# Patient Record
Sex: MALE | Race: WHITE | NOT HISPANIC OR LATINO | Employment: OTHER | URBAN - METROPOLITAN AREA
[De-identification: names, ages, dates, MRNs, and addresses within clinical notes are randomized per-mention and may not be internally consistent; named-entity substitution may affect disease eponyms.]

---

## 2019-03-09 ENCOUNTER — HOSPITAL ENCOUNTER (EMERGENCY)
Facility: HOSPITAL | Age: 59
Discharge: HOME | End: 2019-03-09
Attending: EMERGENCY MEDICINE
Payer: COMMERCIAL

## 2019-03-09 ENCOUNTER — APPOINTMENT (EMERGENCY)
Dept: RADIOLOGY | Facility: HOSPITAL | Age: 59
End: 2019-03-09
Payer: COMMERCIAL

## 2019-03-09 VITALS
DIASTOLIC BLOOD PRESSURE: 67 MMHG | WEIGHT: 314.38 LBS | OXYGEN SATURATION: 97 % | SYSTOLIC BLOOD PRESSURE: 126 MMHG | RESPIRATION RATE: 20 BRPM | HEART RATE: 76 BPM | TEMPERATURE: 97.9 F

## 2019-03-09 DIAGNOSIS — R11.0 NAUSEA: Primary | ICD-10-CM

## 2019-03-09 LAB
ALBUMIN SERPL BCP-MCNC: 3.9 G/DL (ref 3.5–5)
ALP SERPL-CCNC: 105 U/L (ref 46–116)
ALT SERPL W P-5'-P-CCNC: 34 U/L (ref 12–78)
ANION GAP SERPL CALCULATED.3IONS-SCNC: 9 MMOL/L (ref 4–13)
AST SERPL W P-5'-P-CCNC: 18 U/L (ref 5–45)
ATRIAL RATE: 81 BPM
BASOPHILS # BLD AUTO: 0.06 THOUSANDS/ΜL (ref 0–0.1)
BASOPHILS NFR BLD AUTO: 1 % (ref 0–1)
BILIRUB SERPL-MCNC: 0.5 MG/DL (ref 0.2–1)
BUN SERPL-MCNC: 9 MG/DL (ref 5–25)
CALCIUM SERPL-MCNC: 9 MG/DL (ref 8.3–10.1)
CHLORIDE SERPL-SCNC: 102 MMOL/L (ref 100–108)
CO2 SERPL-SCNC: 24 MMOL/L (ref 21–32)
CREAT SERPL-MCNC: 1.29 MG/DL (ref 0.6–1.3)
EOSINOPHIL # BLD AUTO: 0.02 THOUSAND/ΜL (ref 0–0.61)
EOSINOPHIL NFR BLD AUTO: 0 % (ref 0–6)
ERYTHROCYTE [DISTWIDTH] IN BLOOD BY AUTOMATED COUNT: 13.3 % (ref 11.6–15.1)
GFR SERPL CREATININE-BSD FRML MDRD: 60 ML/MIN/1.73SQ M
GLUCOSE SERPL-MCNC: 102 MG/DL (ref 65–140)
GLUCOSE SERPL-MCNC: 114 MG/DL (ref 65–140)
HCT VFR BLD AUTO: 45.5 % (ref 36.5–49.3)
HGB BLD-MCNC: 15.8 G/DL (ref 12–17)
IMM GRANULOCYTES # BLD AUTO: 0.02 THOUSAND/UL (ref 0–0.2)
IMM GRANULOCYTES NFR BLD AUTO: 0 % (ref 0–2)
LIPASE SERPL-CCNC: 123 U/L (ref 73–393)
LYMPHOCYTES # BLD AUTO: 0.53 THOUSANDS/ΜL (ref 0.6–4.47)
LYMPHOCYTES NFR BLD AUTO: 9 % (ref 14–44)
MAGNESIUM SERPL-MCNC: 2.1 MG/DL (ref 1.6–2.6)
MCH RBC QN AUTO: 30.2 PG (ref 26.8–34.3)
MCHC RBC AUTO-ENTMCNC: 34.7 G/DL (ref 31.4–37.4)
MCV RBC AUTO: 87 FL (ref 82–98)
MONOCYTES # BLD AUTO: 0.4 THOUSAND/ΜL (ref 0.17–1.22)
MONOCYTES NFR BLD AUTO: 7 % (ref 4–12)
NEUTROPHILS # BLD AUTO: 4.79 THOUSANDS/ΜL (ref 1.85–7.62)
NEUTS SEG NFR BLD AUTO: 83 % (ref 43–75)
NRBC BLD AUTO-RTO: 0 /100 WBCS
NT-PROBNP SERPL-MCNC: 173 PG/ML
P AXIS: 24 DEGREES
PLATELET # BLD AUTO: 182 THOUSANDS/UL (ref 149–390)
PMV BLD AUTO: 9.2 FL (ref 8.9–12.7)
POTASSIUM SERPL-SCNC: 4.2 MMOL/L (ref 3.5–5.3)
PR INTERVAL: 182 MS
PROT SERPL-MCNC: 7.6 G/DL (ref 6.4–8.2)
QRS AXIS: -29 DEGREES
QRSD INTERVAL: 88 MS
QT INTERVAL: 358 MS
QTC INTERVAL: 415 MS
RBC # BLD AUTO: 5.24 MILLION/UL (ref 3.88–5.62)
SODIUM SERPL-SCNC: 135 MMOL/L (ref 136–145)
T WAVE AXIS: 25 DEGREES
TROPONIN I SERPL-MCNC: <0.02 NG/ML
VENTRICULAR RATE: 81 BPM
WBC # BLD AUTO: 5.82 THOUSAND/UL (ref 4.31–10.16)

## 2019-03-09 PROCEDURE — 93005 ELECTROCARDIOGRAM TRACING: CPT

## 2019-03-09 PROCEDURE — 96361 HYDRATE IV INFUSION ADD-ON: CPT

## 2019-03-09 PROCEDURE — 96374 THER/PROPH/DIAG INJ IV PUSH: CPT

## 2019-03-09 PROCEDURE — 36415 COLL VENOUS BLD VENIPUNCTURE: CPT | Performed by: EMERGENCY MEDICINE

## 2019-03-09 PROCEDURE — 85025 COMPLETE CBC W/AUTO DIFF WBC: CPT | Performed by: EMERGENCY MEDICINE

## 2019-03-09 PROCEDURE — 83880 ASSAY OF NATRIURETIC PEPTIDE: CPT | Performed by: EMERGENCY MEDICINE

## 2019-03-09 PROCEDURE — 99284 EMERGENCY DEPT VISIT MOD MDM: CPT

## 2019-03-09 PROCEDURE — 96375 TX/PRO/DX INJ NEW DRUG ADDON: CPT

## 2019-03-09 PROCEDURE — 83735 ASSAY OF MAGNESIUM: CPT | Performed by: EMERGENCY MEDICINE

## 2019-03-09 PROCEDURE — 71045 X-RAY EXAM CHEST 1 VIEW: CPT

## 2019-03-09 PROCEDURE — 93010 ELECTROCARDIOGRAM REPORT: CPT | Performed by: INTERNAL MEDICINE

## 2019-03-09 PROCEDURE — 83690 ASSAY OF LIPASE: CPT | Performed by: EMERGENCY MEDICINE

## 2019-03-09 PROCEDURE — 82948 REAGENT STRIP/BLOOD GLUCOSE: CPT

## 2019-03-09 PROCEDURE — 80053 COMPREHEN METABOLIC PANEL: CPT | Performed by: EMERGENCY MEDICINE

## 2019-03-09 PROCEDURE — C9113 INJ PANTOPRAZOLE SODIUM, VIA: HCPCS | Performed by: EMERGENCY MEDICINE

## 2019-03-09 PROCEDURE — 84484 ASSAY OF TROPONIN QUANT: CPT | Performed by: EMERGENCY MEDICINE

## 2019-03-09 RX ORDER — ONDANSETRON 2 MG/ML
4 INJECTION INTRAMUSCULAR; INTRAVENOUS ONCE
Status: COMPLETED | OUTPATIENT
Start: 2019-03-09 | End: 2019-03-09

## 2019-03-09 RX ORDER — PANTOPRAZOLE SODIUM 40 MG/1
40 INJECTION, POWDER, FOR SOLUTION INTRAVENOUS ONCE
Status: COMPLETED | OUTPATIENT
Start: 2019-03-09 | End: 2019-03-09

## 2019-03-09 RX ORDER — ONDANSETRON 4 MG/1
4 TABLET, FILM COATED ORAL EVERY 8 HOURS PRN
Qty: 20 TABLET | Refills: 0 | Status: SHIPPED | OUTPATIENT
Start: 2019-03-09

## 2019-03-09 RX ADMIN — PANTOPRAZOLE SODIUM 40 MG: 40 INJECTION, POWDER, FOR SOLUTION INTRAVENOUS at 12:06

## 2019-03-09 RX ADMIN — ONDANSETRON 4 MG: 2 INJECTION INTRAMUSCULAR; INTRAVENOUS at 10:34

## 2019-03-09 RX ADMIN — SODIUM CHLORIDE 1000 ML: 0.9 INJECTION, SOLUTION INTRAVENOUS at 10:36

## 2019-03-09 NOTE — ED NOTES
Medical records request from [facility] [place] sent now       Harriet Chandler, GABBIE  03/09/19 4747

## 2019-03-09 NOTE — ED PROVIDER NOTES
History  Chief Complaint   Patient presents with    Dizziness     complains of dizziness , and nausea this am, feels like he's going to pass out  seen at [facility] 10 days ago, Has had multilple tests done there including a Cat Scan, No normal BM since October     60year-old gentleman comes in for nausea and feeling like he is going to pass out  Patient states that he has had trouble with his stomach since October  Patient reports alternating constipation and diarrhea  Patient states he has been through a battery of tests including going to see Cardiology and having a stress echo that was negative he has also been did GI and they tried several different medications  Patient complains of acid reflux like symptoms and also constipation today  He is currently taking Zantac and MiraLax for his symptoms  Patient states that he is set up to have an endoscopy next week with GI  Patient had CT scan 10 days ago at an outside medical center which was normal      History provided by:  Patient   used: No    Dizziness   Quality:  Lightheadedness  Severity:  Moderate  Onset quality:  Sudden  Duration:  1 hour  Timing:  Constant  Progression:  Unchanged  Chronicity:  Recurrent  Context: inactivity    Ineffective treatments:  Change in position, lying down and medication  Associated symptoms: weakness    Associated symptoms: no blood in stool, no chest pain, no headaches and no shortness of breath    Risk factors: no anemia and no Meniere's disease        None       Past Medical History:   Diagnosis Date    GERD (gastroesophageal reflux disease)     Hypertension        History reviewed  No pertinent surgical history  History reviewed  No pertinent family history  I have reviewed and agree with the history as documented  Social History     Tobacco Use    Smoking status: Never Smoker    Smokeless tobacco: Never Used   Substance Use Topics    Alcohol use:  Yes     Alcohol/week: 3 0 oz Types: 5 Shots of liquor per week    Drug use: Yes     Frequency: 1 0 times per week     Types: Marijuana        Review of Systems   Constitutional: Negative for activity change and appetite change  HENT: Negative for congestion and facial swelling  Eyes: Negative for discharge and redness  Respiratory: Negative for cough, shortness of breath and wheezing  Cardiovascular: Negative for chest pain and leg swelling  Gastrointestinal: Negative for abdominal distention, abdominal pain and blood in stool  Endocrine: Negative for cold intolerance and polydipsia  Genitourinary: Negative for difficulty urinating and hematuria  Musculoskeletal: Negative for arthralgias and gait problem  Skin: Negative for color change and rash  Allergic/Immunologic: Negative for food allergies and immunocompromised state  Neurological: Positive for dizziness and weakness  Negative for seizures and headaches  Hematological: Negative for adenopathy  Does not bruise/bleed easily  Psychiatric/Behavioral: Negative for agitation, confusion and decreased concentration  All other systems reviewed and are negative  Physical Exam  Physical Exam   Constitutional: He is oriented to person, place, and time  He appears well-developed and well-nourished  Non-toxic appearance  He appears distressed  HENT:   Head: Normocephalic and atraumatic  Right Ear: Tympanic membrane normal    Left Ear: Tympanic membrane normal    Nose: Nose normal    Mouth/Throat: Oropharynx is clear and moist    Eyes: Pupils are equal, round, and reactive to light  Conjunctivae, EOM and lids are normal    Neck: Trachea normal and normal range of motion  Neck supple  No JVD present  Carotid bruit is not present  Cardiovascular: Normal rate, regular rhythm, normal heart sounds and intact distal pulses  No extrasystoles are present  Pulmonary/Chest: Effort normal  He has no decreased breath sounds  He has no wheezes  He has no rhonchi   He has no rales  He exhibits no tenderness and no deformity  Abdominal: Soft  Bowel sounds are normal  There is tenderness  There is no rebound, no guarding and no CVA tenderness  Musculoskeletal:        Right shoulder: He exhibits normal range of motion, no tenderness, no swelling and no deformity  Cervical back: Normal  He exhibits normal range of motion, no tenderness, no bony tenderness and no deformity  Lymphadenopathy:     He has no cervical adenopathy  He has no axillary adenopathy  Neurological: He is alert and oriented to person, place, and time  He has normal strength and normal reflexes  No cranial nerve deficit or sensory deficit  He displays a negative Romberg sign  Skin: Skin is warm and dry  Psychiatric: He has a normal mood and affect  His speech is normal and behavior is normal  Judgment and thought content normal  Cognition and memory are normal    Nursing note and vitals reviewed        Vital Signs  ED Triage Vitals   Temperature Pulse Respirations Blood Pressure SpO2   03/09/19 1010 03/09/19 1010 03/09/19 1010 03/09/19 1010 03/09/19 1010   97 9 °F (36 6 °C) 82 20 145/76 96 %      Temp Source Heart Rate Source Patient Position - Orthostatic VS BP Location FiO2 (%)   03/09/19 1010 -- -- 03/09/19 1030 --   Oral   Right arm       Pain Score       03/09/19 1030       1           Vitals:    03/09/19 1010 03/09/19 1030   BP: 145/76 126/67   Pulse: 82 76       Visual Acuity  Visual Acuity      Most Recent Value   L Pupil Size (mm)  3   R Pupil Size (mm)  3          ED Medications  Medications   pantoprazole (PROTONIX) injection 40 mg (has no administration in time range)   ondansetron (ZOFRAN) injection 4 mg (4 mg Intravenous Given 3/9/19 1034)   sodium chloride 0 9 % bolus 1,000 mL (1,000 mL Intravenous New Bag 3/9/19 1036)       Diagnostic Studies  Results Reviewed     Procedure Component Value Units Date/Time    Lipase [[id number]]  (Normal) Collected:  03/09/19 1032    Lab Status: Final result Specimen:  Blood from Arm, Left Updated:  03/09/19 1107     Lipase 123 u/L     Magnesium [[id number]]  (Normal) Collected:  03/09/19 1032    Lab Status:  Final result Specimen:  Blood from Arm, Left Updated:  03/09/19 1107     Magnesium 2 1 mg/dL     B-type natriuretic peptide [[id number]]  (Abnormal) Collected:  03/09/19 1032    Lab Status:  Final result Specimen:  Blood from Arm, Left Updated:  03/09/19 1107     NT-proBNP 173 pg/mL     Troponin I [[id number]]  (Normal) Collected:  03/09/19 1032    Lab Status:  Final result Specimen:  Blood from Arm, Left Updated:  03/09/19 1101     Troponin I <0 02 ng/mL     Comprehensive metabolic panel [[id number]]  (Abnormal) Collected:  03/09/19 1032    Lab Status:  Final result Specimen:  Blood from Arm, Left Updated:  03/09/19 1059     Sodium 135 mmol/L      Potassium 4 2 mmol/L      Chloride 102 mmol/L      CO2 24 mmol/L      ANION GAP 9 mmol/L      BUN 9 mg/dL      Creatinine 1 29 mg/dL      Glucose 114 mg/dL      Calcium 9 0 mg/dL      AST 18 U/L      ALT 34 U/L      Alkaline Phosphatase 105 U/L      Total Protein 7 6 g/dL      Albumin 3 9 g/dL      Total Bilirubin 0 50 mg/dL      eGFR 60 ml/min/1 73sq m     Narrative:       National Kidney Disease Education Program recommendations are as follows:  GFR calculation is accurate only with a steady state creatinine  Chronic Kidney disease less than 60 ml/min/1 73 sq  meters  Kidney failure less than 15 ml/min/1 73 sq  meters      CBC and differential [[id number]]  (Abnormal) Collected:  03/09/19 1032    Lab Status:  Final result Specimen:  Blood from Arm, Left Updated:  03/09/19 1047     WBC 5 82 Thousand/uL      RBC 5 24 Million/uL      Hemoglobin 15 8 g/dL      Hematocrit 45 5 %      MCV 87 fL      MCH 30 2 pg      MCHC 34 7 g/dL      RDW 13 3 %      MPV 9 2 fL      Platelets 628 Thousands/uL      nRBC 0 /100 WBCs      Neutrophils Relative 83 %      Immat GRANS % 0 %      Lymphocytes Relative 9 %      Monocytes Relative 7 %      Eosinophils Relative 0 %      Basophils Relative 1 %      Neutrophils Absolute 4 79 Thousands/µL      Immature Grans Absolute 0 02 Thousand/uL      Lymphocytes Absolute 0 53 Thousands/µL      Monocytes Absolute 0 40 Thousand/µL      Eosinophils Absolute 0 02 Thousand/µL      Basophils Absolute 0 06 Thousands/µL     Fingerstick Glucose (POCT) [[id number]]  (Normal) Collected:  03/09/19 1023    Lab Status:  Final result Updated:  03/09/19 1025     POC Glucose 102 mg/dl                  XR chest 1 view portable    (Results Pending)              Procedures  ECG 12 Lead Documentation  Date/Time: 3/9/2019 10:22 AM  Performed by: Mich Bradshaw DO  Authorized by: Mich Bradshaw DO     Patient location:  ED  Rate:     ECG rate:  81  Rhythm:     Rhythm: sinus rhythm    Conduction:     Conduction: abnormal    ST segments:     ST segments:  Normal  T waves:     T waves: normal             Phone Contacts  ED Phone Contact    ED Course                               MDM  Number of Diagnoses or Management Options  Nausea: new and requires workup     Amount and/or Complexity of Data Reviewed  Clinical lab tests: ordered and reviewed  Tests in the radiology section of CPT®: ordered and reviewed  Tests in the medicine section of CPT®: ordered and reviewed    Patient Progress  Patient progress: improved      Disposition  Final diagnoses:   Nausea     Time reflects when diagnosis was documented in both MDM as applicable and the Disposition within this note     Time User Action Codes Description Comment    3/9/2019 11:42 AM Houston Tsang Add [R11 0] Nausea       ED Disposition     ED Disposition Condition Date/Time Comment    Discharge Stable Sat Mar 9, 2019 11:42 AM Margy Gunderson discharge to home/self care              Follow-up Information     Follow up With Specialties Details Why Contact Info    Dc Ward DO  Schedule an appointment as soon as possible for a visit in 1 day  [address] [place] [address] [address] [facility]  [phone]            Patient's Medications   Discharge Prescriptions    ONDANSETRON (ZOFRAN) 4 MG TABLET    Take 1 tablet (4 mg total) by mouth every 8 (eight) hours as needed for nausea or vomiting       Start Date: 3/9/2019  End Date: --       Order Dose: 4 mg       Quantity: 20 tablet    Refills: 0     No discharge procedures on file      ED Provider  Electronically Signed by           Regina Cheng DO  03/09/19 4731

## 2024-12-02 ENCOUNTER — APPOINTMENT (OUTPATIENT)
Dept: LAB | Facility: CLINIC | Age: 64
End: 2024-12-02
Payer: MEDICARE

## 2024-12-02 ENCOUNTER — OFFICE VISIT (OUTPATIENT)
Dept: FAMILY MEDICINE CLINIC | Facility: CLINIC | Age: 64
End: 2024-12-02
Payer: COMMERCIAL

## 2024-12-02 VITALS
SYSTOLIC BLOOD PRESSURE: 122 MMHG | BODY MASS INDEX: 35.16 KG/M2 | RESPIRATION RATE: 18 BRPM | TEMPERATURE: 98 F | DIASTOLIC BLOOD PRESSURE: 82 MMHG | OXYGEN SATURATION: 94 % | HEART RATE: 76 BPM | HEIGHT: 74 IN | WEIGHT: 274 LBS

## 2024-12-02 DIAGNOSIS — Z01.818 PREOP EXAMINATION: Primary | ICD-10-CM

## 2024-12-02 DIAGNOSIS — D3A.00 CARCINOID (EXCEPT OF APPENDIX): ICD-10-CM

## 2024-12-02 DIAGNOSIS — I10 PRIMARY HYPERTENSION: ICD-10-CM

## 2024-12-02 DIAGNOSIS — E66.01 OBESITY, MORBID (HCC): ICD-10-CM

## 2024-12-02 DIAGNOSIS — I34.1 MVP (MITRAL VALVE PROLAPSE): ICD-10-CM

## 2024-12-02 DIAGNOSIS — Z01.818 PREOP EXAMINATION: ICD-10-CM

## 2024-12-02 DIAGNOSIS — I27.82 OTHER CHRONIC PULMONARY EMBOLISM WITHOUT ACUTE COR PULMONALE (HCC): ICD-10-CM

## 2024-12-02 LAB
ALBUMIN SERPL BCG-MCNC: 4.3 G/DL (ref 3.5–5)
ALP SERPL-CCNC: 96 U/L (ref 34–104)
ALT SERPL W P-5'-P-CCNC: 18 U/L (ref 7–52)
ANION GAP SERPL CALCULATED.3IONS-SCNC: 7 MMOL/L (ref 4–13)
AST SERPL W P-5'-P-CCNC: 19 U/L (ref 13–39)
BASOPHILS # BLD AUTO: 0.04 THOUSANDS/ΜL (ref 0–0.1)
BASOPHILS NFR BLD AUTO: 1 % (ref 0–1)
BILIRUB SERPL-MCNC: 0.59 MG/DL (ref 0.2–1)
BUN SERPL-MCNC: 8 MG/DL (ref 5–25)
CALCIUM SERPL-MCNC: 9.4 MG/DL (ref 8.4–10.2)
CHLORIDE SERPL-SCNC: 103 MMOL/L (ref 96–108)
CO2 SERPL-SCNC: 29 MMOL/L (ref 21–32)
CREAT SERPL-MCNC: 0.91 MG/DL (ref 0.6–1.3)
EOSINOPHIL # BLD AUTO: 0.08 THOUSAND/ΜL (ref 0–0.61)
EOSINOPHIL NFR BLD AUTO: 1 % (ref 0–6)
ERYTHROCYTE [DISTWIDTH] IN BLOOD BY AUTOMATED COUNT: 13.5 % (ref 11.6–15.1)
GFR SERPL CREATININE-BSD FRML MDRD: 88 ML/MIN/1.73SQ M
GLUCOSE SERPL-MCNC: 96 MG/DL (ref 65–140)
HCT VFR BLD AUTO: 47.6 % (ref 36.5–49.3)
HGB BLD-MCNC: 16 G/DL (ref 12–17)
IMM GRANULOCYTES # BLD AUTO: 0.02 THOUSAND/UL (ref 0–0.2)
IMM GRANULOCYTES NFR BLD AUTO: 0 % (ref 0–2)
LYMPHOCYTES # BLD AUTO: 2.04 THOUSANDS/ΜL (ref 0.6–4.47)
LYMPHOCYTES NFR BLD AUTO: 25 % (ref 14–44)
MCH RBC QN AUTO: 30.3 PG (ref 26.8–34.3)
MCHC RBC AUTO-ENTMCNC: 33.6 G/DL (ref 31.4–37.4)
MCV RBC AUTO: 90 FL (ref 82–98)
MONOCYTES # BLD AUTO: 0.43 THOUSAND/ΜL (ref 0.17–1.22)
MONOCYTES NFR BLD AUTO: 5 % (ref 4–12)
NEUTROPHILS # BLD AUTO: 5.48 THOUSANDS/ΜL (ref 1.85–7.62)
NEUTS SEG NFR BLD AUTO: 68 % (ref 43–75)
NRBC BLD AUTO-RTO: 0 /100 WBCS
PLATELET # BLD AUTO: 200 THOUSANDS/UL (ref 149–390)
PMV BLD AUTO: 10.1 FL (ref 8.9–12.7)
POTASSIUM SERPL-SCNC: 4.9 MMOL/L (ref 3.5–5.3)
PROT SERPL-MCNC: 7.3 G/DL (ref 6.4–8.4)
RBC # BLD AUTO: 5.28 MILLION/UL (ref 3.88–5.62)
SODIUM SERPL-SCNC: 139 MMOL/L (ref 135–147)
WBC # BLD AUTO: 8.09 THOUSAND/UL (ref 4.31–10.16)

## 2024-12-02 PROCEDURE — 80053 COMPREHEN METABOLIC PANEL: CPT

## 2024-12-02 PROCEDURE — 99204 OFFICE O/P NEW MOD 45 MIN: CPT | Performed by: STUDENT IN AN ORGANIZED HEALTH CARE EDUCATION/TRAINING PROGRAM

## 2024-12-02 PROCEDURE — 36415 COLL VENOUS BLD VENIPUNCTURE: CPT

## 2024-12-02 PROCEDURE — 85025 COMPLETE CBC W/AUTO DIFF WBC: CPT

## 2024-12-02 RX ORDER — BACILLUS COAGULANS/INULIN 1B-250 MG
CAPSULE ORAL
COMMUNITY
Start: 2019-12-01

## 2024-12-02 RX ORDER — ESOMEPRAZOLE MAGNESIUM 10 MG/1
GRANULE, FOR SUSPENSION, EXTENDED RELEASE ORAL
COMMUNITY
Start: 2019-12-01

## 2024-12-02 RX ORDER — ATORVASTATIN CALCIUM 20 MG/1
20 TABLET, FILM COATED ORAL DAILY
COMMUNITY

## 2024-12-02 RX ORDER — CANDESARTAN 16 MG/1
TABLET ORAL
COMMUNITY
Start: 2022-12-01

## 2024-12-02 RX ORDER — ATORVASTATIN CALCIUM 10 MG/1
10 TABLET, FILM COATED ORAL EVERY EVENING
COMMUNITY
Start: 2024-09-17 | End: 2024-12-02

## 2024-12-02 NOTE — PROGRESS NOTES
Clinic Visit Note  Satnam Aldrich 64 y.o. male   MRN: [id number]    Assessment and Plan      Diagnoses and all orders for this visit:    Preop examination  -     CBC and differential; Future  -     Comprehensive metabolic panel; Future  -     Ambulatory Referral to Pulmonology; Future    Other chronic pulmonary embolism without acute cor pulmonale (HCC)  -     Discontinue: rivaroxaban (XARELTO) 20 mg tablet; Take 1 tablet (20 mg total) by mouth daily with breakfast  -     apixaban (ELIQUIS) 5 mg; Take 1 tablet (5 mg total) by mouth 2 (two) times a day  -     Ambulatory Referral to Pulmonology; Future    Carcinoid (except of appendix)  -     Ambulatory Referral to Pulmonology; Future    MVP (mitral valve prolapse)    Primary hypertension    Other orders  -     Discontinue: apixaban (ELIQUIS) 2.5 mg; Take 2.5 mg by mouth 2 (two) times a day  -     atorvastatin (LIPITOR) 20 mg tablet; Take 20 mg by mouth daily  -     Discontinue: Atorvastatin Calcium 20 MG/5ML SUSP;  (Patient not taking: Reported on 12/2/2024)  -     Bacillus Coagulans-Inulin (Probiotic) 1-250 BILLION-MG CAPS  -     candesartan (ATACAND) 16 mg tablet  -     esomeprazole (NexIUM) 10 MG packet  -     Multiple Vitamin (MULTIVITAMIN ADULT PO)  -     Discontinue: atorvastatin (LIPITOR) 10 mg tablet; Take 10 mg by mouth every evening (Patient not taking: Reported on 12/2/2024)      Patient is a 64-year-old male coming in for new patient encounter, medications reviewed, medical history reviewed.    Recent pulmonary embolism.  Patient is currently on Eliquis anticoagulation, refilled in office today.  Echocardiogram reviewed, no right heart strain, asymptomatic on exam today.    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  Planned surgical procedure, laparoscopic resection small intestine 12/10/2024.    We discussed risks and benefits of surgery, EKG shows NSR  without signs of ischemia, CBC/CMP appropriate.  Patient has no respiratory distress, no shortness of breath, chest pain or palpitations.  Benefits outweigh risk, patient is medically optimized for planned procedure.    Recommend stopping Eliquis 3 days prior to surgical procedure, bridge with Lovenox, agree with surgical oncology team, last dose of Lovenox night before procedure 12/10/2024..  Appreciate surgical recommendations/follow-up on restarting Eliquis.    Patient notes that he is scheduled for an IVC filter this coming Wednesday.    Continue close follow-up, Medicare annual wellness next visit.    Recommend exercise as tolerated on anticoagulation.    My impressions and treatment recommendations were discussed in detail with the patient who verbalized understanding and had no further questions.  Discharge instructions were provided.    Subjective     Chief Complaint: NP    History of Present Illness:    Patient is a pleasant 64-year-old male coming in for new patient encounter.  Medications reviewed, medical history reviewed.    The following portions of the patient's history were reviewed and updated as appropriate: allergies, current medications, past family history, past medical history, past social history, past surgical history and problem list.    REVIEW OF SYSTEMS:  A complete 12-point review of systems is negative other than that noted in the HPI.    Review of Systems   Constitutional:  Negative for chills, fatigue and fever.   HENT:  Negative for congestion and sore throat.    Eyes:  Negative for pain and visual disturbance.   Respiratory:  Negative for cough, shortness of breath and wheezing.    Cardiovascular:  Negative for chest pain and palpitations.   Gastrointestinal:  Negative for abdominal pain, constipation, diarrhea, nausea and vomiting.   Genitourinary:  Negative for dysuria and frequency.   Musculoskeletal:  Negative for back pain and neck pain.   Skin:  Negative for color change and  rash.   Neurological:  Negative for dizziness and headaches.   Psychiatric/Behavioral:  Negative for agitation and confusion.    All other systems reviewed and are negative.        Current Outpatient Medications:     apixaban (ELIQUIS) 5 mg, Take 1 tablet (5 mg total) by mouth 2 (two) times a day, Disp: 60 tablet, Rfl: 0    atorvastatin (LIPITOR) 20 mg tablet, Take 20 mg by mouth daily, Disp: , Rfl:     Bacillus Coagulans-Inulin (Probiotic) 1-250 BILLION-MG CAPS, , Disp: , Rfl:     candesartan (ATACAND) 16 mg tablet, , Disp: , Rfl:     esomeprazole (NexIUM) 10 MG packet, , Disp: , Rfl:     Multiple Vitamin (MULTIVITAMIN ADULT PO), , Disp: , Rfl:   Past Medical History:   Diagnosis Date    Cancer (HCC) 10/29/24    Er visit for pe    GERD (gastroesophageal reflux disease)     Hypertension      Past Surgical History:   Procedure Laterality Date    SMALL INTESTINE SURGERY  12/10/24    Having IVc filter on 12/4/24     Social History     Socioeconomic History    Marital status: /Civil Union     Spouse name: Not on file    Number of children: Not on file    Years of education: Not on file    Highest education level: Not on file   Occupational History    Not on file   Tobacco Use    Smoking status: Never    Smokeless tobacco: Never   Vaping Use    Vaping status: Some Days    Substances: THC   Substance and Sexual Activity    Alcohol use: Yes     Alcohol/week: 5.0 standard drinks of alcohol     Types: 5 Shots of liquor per week    Drug use: Yes     Frequency: 1.0 times per week     Types: Marijuana     Comment: Vape and edible    Sexual activity: Yes     Partners: Female   Other Topics Concern    Not on file   Social History Narrative    Not on file     Social Drivers of Health     Financial Resource Strain: Not on file   Food Insecurity: Not on file   Transportation Needs: Not on file   Physical Activity: Not on file   Stress: Not on file   Social Connections: Not on file   Intimate Partner Violence: Not on file  "  Housing Stability: Not on file     History reviewed. No pertinent family history.  No Known Allergies    Objective     Vitals:    12/02/24 1000   BP: 122/82   BP Location: Left arm   Patient Position: Sitting   Cuff Size: Large   Pulse: 76   Resp: 18   Temp: 98 °F (36.7 °C)   TempSrc: Temporal   SpO2: 94%   Weight: 124 kg (274 lb)   Height: 6' 2\" (1.88 m)       Physical Exam:     GENERAL: NAD, pleasant   HEENT:  NC/AT, PERRL, EOMI, no scleral icterus  CARDIAC:  RRR, +S1/S2, no S3/S4 appreciated, no m/g/r  PULMONARY:  CTA B/L, no wheezing/rales/rhonci, non-labored breathing  ABDOMEN:  Soft, NT/ND, no rebound/guarding/rigidity  Extremities:. No edema, cyanosis, or clubbing  Musculoskeletal:  Full range of motion intact in all extremities   NEUROLOGIC: Grossly intact, no focal deficits  SKIN:  No rashes or erythema noted on exposed skin  Psych: Normal affect, mood stable    ==  PLEASE NOTE:  This encounter was completed utilizing the [employer] Direct Speech Voice Recognition Software. Grammatical errors, random word insertions, pronoun errors and incomplete sentences are occasional consequences of the system due to software limitations, ambient noise and hardware issues.These may be missed by proof reading prior to affixing electronic signature. Any questions or concerns about the content, text or information contained within the body of this dictation should be directly addressed to the physician for clarification. Please do not hesitate to call me directly if you have any any questions or concerns.    Justin Lagos, DO  [facility]   [facility]     "

## 2024-12-02 NOTE — LETTER
December 2, 2024     Patient: Satnam Aldrich  YOB: 1960  Date of Visit: 12/2/2024      To Whom it May Concern:    Satnam Aldrich is under my professional care.     Patient may return to gym without restrictions, exercise as tolerated.    If you have any questions or concerns, please don't hesitate to call.         Sincerely,          Justin Lagos, DO        CC: No Recipients

## 2024-12-03 ENCOUNTER — TELEPHONE (OUTPATIENT)
Age: 64
End: 2024-12-03

## 2024-12-03 PROBLEM — E66.01 OBESITY, MORBID (HCC): Status: ACTIVE | Noted: 2024-12-03

## 2024-12-03 NOTE — TELEPHONE ENCOUNTER
Gayatri from Dr Squires's office called.  She understood that there were some questions and some confusion about pt's appt with Dr Lagos yesterday.  Pt is scheduled to have a laparoscopic small intestine resection by Dr Squires on 12/10/24.  She wanted to let Dr Lagos know that no pulmonary clearance is required.  She will be faxing over pre-op paperwork, and will try to track down a recent EKG for Dr Lagos to review.  If there is anything she can do to help, please call her directly at [phone].

## 2024-12-04 NOTE — TELEPHONE ENCOUNTER
LEFT MESSAGE ON MACHINE for Gayatri to see if she can fax echo report.    LEFT MESSAGE ON MACHINE for patient to see where he went for Echo.

## 2024-12-18 ENCOUNTER — TRANSITIONAL CARE MANAGEMENT (OUTPATIENT)
Dept: FAMILY MEDICINE CLINIC | Facility: CLINIC | Age: 64
End: 2024-12-18

## 2024-12-19 ENCOUNTER — OFFICE VISIT (OUTPATIENT)
Dept: FAMILY MEDICINE CLINIC | Facility: CLINIC | Age: 64
End: 2024-12-19
Payer: MEDICARE

## 2024-12-19 VITALS
HEART RATE: 66 BPM | RESPIRATION RATE: 16 BRPM | SYSTOLIC BLOOD PRESSURE: 130 MMHG | HEIGHT: 74 IN | TEMPERATURE: 97.7 F | WEIGHT: 275 LBS | OXYGEN SATURATION: 97 % | DIASTOLIC BLOOD PRESSURE: 62 MMHG | BODY MASS INDEX: 35.29 KG/M2

## 2024-12-19 DIAGNOSIS — E55.9 VITAMIN D DEFICIENCY: ICD-10-CM

## 2024-12-19 DIAGNOSIS — I27.82 OTHER CHRONIC PULMONARY EMBOLISM WITHOUT ACUTE COR PULMONALE (HCC): ICD-10-CM

## 2024-12-19 DIAGNOSIS — Z13.0 SCREENING, IRON DEFICIENCY ANEMIA: ICD-10-CM

## 2024-12-19 DIAGNOSIS — Z13.29 THYROID DISORDER SCREENING: ICD-10-CM

## 2024-12-19 DIAGNOSIS — Z12.5 PROSTATE CANCER SCREENING: ICD-10-CM

## 2024-12-19 DIAGNOSIS — I10 PRIMARY HYPERTENSION: ICD-10-CM

## 2024-12-19 DIAGNOSIS — D3A.00 CARCINOID (EXCEPT OF APPENDIX): ICD-10-CM

## 2024-12-19 DIAGNOSIS — Z00.00 MEDICARE ANNUAL WELLNESS VISIT, INITIAL: Primary | ICD-10-CM

## 2024-12-19 DIAGNOSIS — E87.8 ELECTROLYTE ABNORMALITY: ICD-10-CM

## 2024-12-19 DIAGNOSIS — Z13.220 SCREENING CHOLESTEROL LEVEL: ICD-10-CM

## 2024-12-19 DIAGNOSIS — I34.1 MVP (MITRAL VALVE PROLAPSE): ICD-10-CM

## 2024-12-19 DIAGNOSIS — E66.01 OBESITY, MORBID (HCC): ICD-10-CM

## 2024-12-19 PROCEDURE — G0402 INITIAL PREVENTIVE EXAM: HCPCS | Performed by: STUDENT IN AN ORGANIZED HEALTH CARE EDUCATION/TRAINING PROGRAM

## 2024-12-19 PROCEDURE — 99214 OFFICE O/P EST MOD 30 MIN: CPT | Performed by: STUDENT IN AN ORGANIZED HEALTH CARE EDUCATION/TRAINING PROGRAM

## 2024-12-19 RX ORDER — OXYCODONE AND ACETAMINOPHEN 5; 325 MG/1; MG/1
1 TABLET ORAL EVERY 8 HOURS PRN
COMMUNITY
Start: 2024-12-18 | End: 2024-12-21

## 2024-12-19 NOTE — PROGRESS NOTES
Name: Satnam Aldrich      : 1960      MRN: [id number]  Encounter Provider: Justin Lagos DO  Encounter Date: 2024   Encounter Department: [facility]    Assessment & Plan  Medicare annual wellness visit, initial         Carcinoid (except of appendix)         Primary hypertension         MVP (mitral valve prolapse)         Other chronic pulmonary embolism without acute cor pulmonale (HCC)    Orders:  •  rivaroxaban (XARELTO) 20 mg tablet; Take 1 tablet (20 mg total) by mouth daily with breakfast  •  Ambulatory Referral to Hematology / Oncology; Future    Obesity, morbid (HCC)         Thyroid disorder screening    Orders:  •  TSH, 3rd generation with Free T4 reflex; Future    Screening, iron deficiency anemia    Orders:  •  CBC and differential; Future    Electrolyte abnormality    Orders:  •  Comprehensive metabolic panel; Future    Screening cholesterol level    Orders:  •  Lipid panel; Future    Vitamin D deficiency    Orders:  •  Vitamin D 25 hydroxy; Future    Prostate cancer screening    Orders:  •  PSA, Total Screen; Future      Patient is a pleasant 64-year-old male coming in for follow-up after 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  Incision site good approximation without signs of infection, patient is healing appropriately.  Patient would like to switch from Eliquis to Xarelto, sent to pharmacy.  Patient will continue to follow-up with surgery team evaluation, recommend follow-up with hematology/oncology medical service.    Recommend yearly blood work mid January, plans to take out IVC filter at the end of January, appreciate recommendations.    Pulmonary embolism likely due to carcinoid tumor, now that patient has had resection, we will discuss with hematology future anticoagulation guidelines.    Patient is doing well with no acute concerns, continue to follow closely.       Preventive health issues were discussed with patient, and age  appropriate screening tests were ordered as noted in patient's After Visit Summary. Personalized health advice and appropriate referrals for health education or preventive services given if needed, as noted in patient's After Visit Summary.    History of Present Illness     HPI   Patient Care Team:  Justin Lagos DO as PCP - General (Family Medicine)    Review of Systems   Constitutional:  Negative for chills and fever.   HENT:  Negative for ear pain and sore throat.    Eyes:  Negative for pain and visual disturbance.   Respiratory:  Negative for cough, shortness of breath and wheezing.    Cardiovascular:  Negative for chest pain and palpitations.   Gastrointestinal:  Positive for abdominal pain. Negative for vomiting.   Genitourinary:  Negative for dysuria and hematuria.   Musculoskeletal:  Negative for arthralgias and back pain.   Skin:  Negative for color change and rash.   Neurological:  Negative for seizures and syncope.   Psychiatric/Behavioral:  Negative for agitation, behavioral problems and confusion.    All other systems reviewed and are negative.    Medical History Reviewed by provider this encounter:  Tobacco  Allergies  Meds  Problems  Med Hx  Surg Hx  Fam Hx       Annual Wellness Visit Questionnaire   Satnam is here for his Subsequent Wellness visit. Last Medicare Wellness visit information reviewed, patient interviewed and updates made to the record today.      Health Risk Assessment:   Patient rates overall health as good. Patient feels that their physical health rating is same. Patient is satisfied with their life. Eyesight was rated as same. Hearing was rated as same. Patient feels that their emotional and mental health rating is same. Patients states they are never, rarely angry. Patient states they are never, rarely unusually tired/fatigued. Pain experienced in the last 7 days has been a lot. Patient's pain rating has been 5/10. Patient states that he has experienced no weight  loss or gain in last 6 months.     Fall Risk Screening:   In the past year, patient has experienced: no history of falling in past year      Home Safety:  Patient does not have trouble with stairs inside or outside of their home. Patient has working smoke alarms and has working carbon monoxide detector. Home safety hazards include: none.     Nutrition:   Current diet is Regular.     Medications:   Patient is not currently taking any over-the-counter supplements. Patient is able to manage medications.     Activities of Daily Living (ADLs)/Instrumental Activities of Daily Living (IADLs):   Walk and transfer into and out of bed and chair?: Yes  Dress and groom yourself?: Yes    Bathe or shower yourself?: Yes    Feed yourself? Yes  Do your laundry/housekeeping?: Yes  Manage your money, pay your bills and track your expenses?: Yes  Make your own meals?: Yes    Do your own shopping?: Yes    Previous Hospitalizations:   Any hospitalizations or ED visits within the last 12 months?: Yes    How many hospitalizations have you had in the last year?: 1-2    Advance Care Planning:   Living will: Yes    Durable POA for healthcare: Yes    Advanced directive: Yes    Advanced directive counseling given: Yes      Cognitive Screening:   Provider or family/friend/caregiver concerned regarding cognition?: No    PREVENTIVE SCREENINGS      Cardiovascular Screening:    General: Risks and Benefits Discussed    Due for: Lipid Panel      Diabetes Screening:     General: Screening Current    Due for: Blood Glucose      Colorectal Cancer Screening:     General: Screening Current      Prostate Cancer Screening:    General: Risks and Benefits Discussed    Due for: PSA      Osteoporosis Screening:    General: Screening Not Indicated      Abdominal Aortic Aneurysm (AAA) Screening:    Risk factors include: age between 65-76 yo        General: Screening Not Indicated      Lung Cancer Screening:     General: Screening Not Indicated      Hepatitis C  "Screening:    General: Risks and Benefits Discussed    Screening, Brief Intervention, and Referral to Treatment (SBIRT)    Screening  Typical number of drinks in a day: 0  Typical number of drinks in a week: 0  Interpretation: Low risk drinking behavior.    Single Item Drug Screening:  How often have you used an illegal drug (including marijuana) or a prescription medication for non-medical reasons in the past year? never    Single Item Drug Screen Score: 0  Interpretation: Negative screen for possible drug use disorder    Brief Intervention  Alcohol & drug use screenings were reviewed. No concerns regarding substance use disorder identified.     Other Counseling Topics:   Car/seat belt/driving safety, skin self-exam, sunscreen and calcium and vitamin D intake and regular weightbearing exercise.     Social Drivers of Health     Food Insecurity: No Food Insecurity (12/11/2024)    Received from [facility]    Hunger Vital Sign    • Worried About Running Out of Food in the Last Year: Never true    • Ran Out of Food in the Last Year: Never true   Transportation Needs: No Transportation Needs (12/11/2024)    Received from [facility]    PRAPARE - Transportation    • Lack of Transportation (Medical): No    • Lack of Transportation (Non-Medical): No   Housing Stability: Unknown (12/11/2024)    Received from [facility]    Housing Stability Vital Sign    • Unable to Pay for Housing in the Last Year: No    • Homeless in the Last Year: No   Utilities: Not At Risk (12/11/2024)    Received from [facility] Utilities    • Threatened with loss of utilities: No     No results found.    Objective   /62 (BP Location: Left arm, Patient Position: Sitting, Cuff Size: Adult)   Pulse 66   Temp 97.7 °F (36.5 °C) (Temporal)   Resp 16   Ht 6' 2\" (1.88 m)   Wt 125 kg (275 lb)   SpO2 97%   BMI 35.31 kg/m²     Physical Exam  Vitals and nursing note reviewed.   Constitutional:       General: He is not in " acute distress.     Appearance: He is well-developed.   HENT:      Head: Normocephalic and atraumatic.   Eyes:      General: No scleral icterus.     Conjunctiva/sclera: Conjunctivae normal.   Cardiovascular:      Rate and Rhythm: Normal rate and regular rhythm.      Pulses: Normal pulses.      Heart sounds: No murmur heard.  Pulmonary:      Effort: Pulmonary effort is normal. No respiratory distress.      Breath sounds: Normal breath sounds.   Abdominal:      General: Bowel sounds are normal. There is no distension.      Palpations: Abdomen is soft.      Tenderness: There is no abdominal tenderness.   Musculoskeletal:         General: No swelling. Normal range of motion.      Cervical back: Neck supple.   Skin:     General: Skin is warm and dry.      Capillary Refill: Capillary refill takes less than 2 seconds.      Coloration: Skin is not jaundiced.   Neurological:      General: No focal deficit present.      Mental Status: He is alert and oriented to person, place, and time. Mental status is at baseline.   Psychiatric:         Mood and Affect: Mood normal.         Behavior: Behavior normal.

## 2024-12-19 NOTE — PATIENT INSTRUCTIONS
Medicare Preventive Visit Patient Instructions  Thank you for completing your Welcome to Medicare Visit or Medicare Annual Wellness Visit today. Your next wellness visit will be due in one year (12/20/2025).  The screening/preventive services that you may require over the next 5-10 years are detailed below. Some tests may not apply to you based off risk factors and/or age. Screening tests ordered at today's visit but not completed yet may show as past due. Also, please note that scanned in results may not display below.  Preventive Screenings:  Service Recommendations Previous Testing/Comments   Colorectal Cancer Screening  Colonoscopy    Fecal Occult Blood Test (FOBT)/Fecal Immunochemical Test (FIT)  Fecal DNA/Cologuard Test  Flexible Sigmoidoscopy Age: 45-75 years old   Colonoscopy: every 10 years (May be performed more frequently if at higher risk)  OR  FOBT/FIT: every 1 year  OR  Cologuard: every 3 years  OR  Sigmoidoscopy: every 5 years  Screening may be recommended earlier than age 45 if at higher risk for colorectal cancer. Also, an individualized decision between you and your healthcare provider will decide whether screening between the ages of 76-85 would be appropriate. Colonoscopy: Not on file  FOBT/FIT: Not on file  Cologuard: Not on file  Sigmoidoscopy: Not on file          Prostate Cancer Screening Individualized decision between patient and health care provider in men between ages of 55-69   Medicare will cover every 12 months beginning on the day after your 50th birthday PSA: No results in last 5 years           Hepatitis C Screening Once for adults born between 1945 and 1965  More frequently in patients at high risk for Hepatitis C Hep C Antibody: Not on file        Diabetes Screening 1-2 times per year if you're at risk for diabetes or have pre-diabetes Fasting glucose: No results in last 5 years (No results in last 5 years)  A1C: No results in last 5 years (No results in last 5 years)       Cholesterol Screening Once every 5 years if you don't have a lipid disorder. May order more often based on risk factors. Lipid panel: Not on file         Other Preventive Screenings Covered by Medicare:  Abdominal Aortic Aneurysm (AAA) Screening: covered once if your at risk. You're considered to be at risk if you have a family history of AAA or a male between the age of 65-75 who smoking at least 100 cigarettes in your lifetime.  Lung Cancer Screening: covers low dose CT scan once per year if you meet all of the following conditions: (1) Age 55-77; (2) No signs or symptoms of lung cancer; (3) Current smoker or have quit smoking within the last 15 years; (4) You have a tobacco smoking history of at least 20 pack years (packs per day x number of years you smoked); (5) You get a written order from a healthcare provider.  Glaucoma Screening: covered annually if you're considered high risk: (1) You have diabetes OR (2) Family history of glaucoma OR (3)  aged 50 and older OR (4)  American aged 65 and older  Osteoporosis Screening: covered every 2 years if you meet one of the following conditions: (1) Have a vertebral abnormality; (2) On glucocorticoid therapy for more than 3 months; (3) Have primary hyperparathyroidism; (4) On osteoporosis medications and need to assess response to drug therapy.  HIV Screening: covered annually if you're between the age of 15-65. Also covered annually if you are younger than 15 and older than 65 with risk factors for HIV infection. For pregnant patients, it is covered up to 3 times per pregnancy.    Immunizations:  Immunization Recommendations   Influenza Vaccine Annual influenza vaccination during flu season is recommended for all persons aged >= 6 months who do not have contraindications   Pneumococcal Vaccine   * Pneumococcal conjugate vaccine = PCV13 (Prevnar 13), PCV15 (Vaxneuvance), PCV20 (Prevnar 20)  * Pneumococcal polysaccharide vaccine = PPSV23  (Pneumovax) Adults 19-63 yo with certain risk factors or if 65+ yo  If never received any pneumonia vaccine: recommend Prevnar 20 (PCV20)  Give PCV20 if previously received 1 dose of PCV13 or PPSV23   Hepatitis B Vaccine 3 dose series if at intermediate or high risk (ex: diabetes, end stage renal disease, liver disease)   Respiratory syncytial virus (RSV) Vaccine - COVERED BY MEDICARE PART D  * RSVPreF3 (Arexvy) CDC recommends that adults 60 years of age and older may receive a single dose of RSV vaccine using shared clinical decision-making (SCDM)   Tetanus (Td) Vaccine - COST NOT COVERED BY MEDICARE PART B Following completion of primary series, a booster dose should be given every 10 years to maintain immunity against tetanus. Td may also be given as tetanus wound prophylaxis.   Tdap Vaccine - COST NOT COVERED BY MEDICARE PART B Recommended at least once for all adults. For pregnant patients, recommended with each pregnancy.   Shingles Vaccine (Shingrix) - COST NOT COVERED BY MEDICARE PART B  2 shot series recommended in those 19 years and older who have or will have weakened immune systems or those 50 years and older     Health Maintenance Due:      Topic Date Due   • Hepatitis C Screening  Never done   • HIV Screening  Never done   • Colorectal Cancer Screening  Never done     Immunizations Due:      Topic Date Due   • Pneumococcal Vaccine: 65+ Years (1 of 1 - PCV) 05/05/2024   • COVID-19 Vaccine (1 - 2024-25 season) Never done     Advance Directives   What are advance directives?  Advance directives are legal documents that state your wishes and plans for medical care. These plans are made ahead of time in case you lose your ability to make decisions for yourself. Advance directives can apply to any medical decision, such as the treatments you want, and if you want to donate organs.   What are the types of advance directives?  There are many types of advance directives, and each state has rules about how to  use them. You may choose a combination of any of the following:  Living will:  This is a written record of the treatment you want. You can also choose which treatments you do not want, which to limit, and which to stop at a certain time. This includes surgery, medicine, IV fluid, and tube feedings.   Durable power of  for healthcare (DPAHC):  This is a written record that states who you want to make healthcare choices for you when you are unable to make them for yourself. This person, called a proxy, is usually a family member or a friend. You may choose more than 1 proxy.  Do not resuscitate (DNR) order:  A DNR order is used in case your heart stops beating or you stop breathing. It is a request not to have certain forms of treatment, such as CPR. A DNR order may be included in other types of advance directives.  Medical directive:  This covers the care that you want if you are in a coma, near death, or unable to make decisions for yourself. You can list the treatments you want for each condition. Treatment may include pain medicine, surgery, blood transfusions, dialysis, IV or tube feedings, and a ventilator (breathing machine).  Values history:  This document has questions about your views, beliefs, and how you feel and think about life. This information can help others choose the care that you would choose.  Why are advance directives important?  An advance directive helps you control your care. Although spoken wishes may be used, it is better to have your wishes written down. Spoken wishes can be misunderstood, or not followed. Treatments may be given even if you do not want them. An advance directive may make it easier for your family to make difficult choices about your care.   Weight Management   Why it is important to manage your weight:  Being overweight increases your risk of health conditions such as heart disease, high blood pressure, type 2 diabetes, and certain types of cancer. It can also  increase your risk for osteoarthritis, sleep apnea, and other respiratory problems. Aim for a slow, steady weight loss. Even a small amount of weight loss can lower your risk of health problems.  How to lose weight safely:  A safe and healthy way to lose weight is to eat fewer calories and get regular exercise. You can lose up about 1 pound a week by decreasing the number of calories you eat by 500 calories each day.   Healthy meal plan for weight management:  A healthy meal plan includes a variety of foods, contains fewer calories, and helps you stay healthy. A healthy meal plan includes the following:  Eat whole-grain foods more often.  A healthy meal plan should contain fiber. Fiber is the part of grains, fruits, and vegetables that is not broken down by your body. Whole-grain foods are healthy and provide extra fiber in your diet. Some examples of whole-grain foods are whole-wheat breads and pastas, oatmeal, brown rice, and bulgur.  Eat a variety of vegetables every day.  Include dark, leafy greens such as spinach, kale, adolfo greens, and mustard greens. Eat yellow and orange vegetables such as carrots, sweet potatoes, and winter squash.   Eat a variety of fruits every day.  Choose fresh or canned fruit (canned in its own juice or light syrup) instead of juice. Fruit juice has very little or no fiber.  Eat low-fat dairy foods.  Drink fat-free (skim) milk or 1% milk. Eat fat-free yogurt and low-fat cottage cheese. Try low-fat cheeses such as mozzarella and other reduced-fat cheeses.  Choose meat and other protein foods that are low in fat.  Choose beans or other legumes such as split peas or lentils. Choose fish, skinless poultry (chicken or turkey), or lean cuts of red meat (beef or pork). Before you cook meat or poultry, cut off any visible fat.   Use less fat and oil.  Try baking foods instead of frying them. Add less fat, such as margarine, sour cream, regular salad dressing and mayonnaise to foods. Eat  fewer high-fat foods. Some examples of high-fat foods include french fries, doughnuts, ice cream, and cakes.  Eat fewer sweets.  Limit foods and drinks that are high in sugar. This includes candy, cookies, regular soda, and sweetened drinks.  Exercise:  Exercise at least 30 minutes per day on most days of the week. Some examples of exercise include walking, biking, dancing, and swimming. You can also fit in more physical activity by taking the stairs instead of the elevator or parking farther away from stores. Ask your healthcare provider about the best exercise plan for you.      © Copyright [employer] 2018 Information is for End User's use only and may not be sold, redistributed or otherwise used for commercial purposes. All illustrations and images included in CareNotes® are the copyrighted property of [employer] Inc[employer] or [employer]

## 2024-12-19 NOTE — ASSESSMENT & PLAN NOTE
Orders:  •  rivaroxaban (XARELTO) 20 mg tablet; Take 1 tablet (20 mg total) by mouth daily with breakfast  •  Ambulatory Referral to Hematology / Oncology; Future

## 2024-12-26 ENCOUNTER — TELEPHONE (OUTPATIENT)
Dept: FAMILY MEDICINE CLINIC | Facility: CLINIC | Age: 64
End: 2024-12-26

## 2024-12-26 DIAGNOSIS — D3A.00 CARCINOID (EXCEPT OF APPENDIX): ICD-10-CM

## 2024-12-26 DIAGNOSIS — I34.1 MVP (MITRAL VALVE PROLAPSE): ICD-10-CM

## 2024-12-26 DIAGNOSIS — I34.1 MVP (MITRAL VALVE PROLAPSE): Primary | ICD-10-CM

## 2024-12-26 NOTE — TELEPHONE ENCOUNTER
Patient called the RX Refill Line. Message is being forwarded to the office.     Patient is requesting if he is able to get samples of Xarelto until 1/2/25 so he doesn't have to pay an extra $500 for insurances purposes. He would like to pick them up today if possible.    Please contact patient at [phone]

## 2024-12-31 ENCOUNTER — CONSULT (OUTPATIENT)
Dept: PULMONOLOGY | Facility: MEDICAL CENTER | Age: 64
End: 2024-12-31
Payer: MEDICARE

## 2024-12-31 VITALS
WEIGHT: 271 LBS | HEART RATE: 71 BPM | TEMPERATURE: 96.8 F | SYSTOLIC BLOOD PRESSURE: 114 MMHG | OXYGEN SATURATION: 95 % | BODY MASS INDEX: 34.79 KG/M2 | DIASTOLIC BLOOD PRESSURE: 66 MMHG

## 2024-12-31 DIAGNOSIS — D3A.00 CARCINOID (EXCEPT OF APPENDIX): ICD-10-CM

## 2024-12-31 DIAGNOSIS — I27.82 OTHER CHRONIC PULMONARY EMBOLISM WITHOUT ACUTE COR PULMONALE (HCC): Primary | ICD-10-CM

## 2024-12-31 DIAGNOSIS — Z01.818 PREOP EXAMINATION: ICD-10-CM

## 2024-12-31 PROCEDURE — 99204 OFFICE O/P NEW MOD 45 MIN: CPT | Performed by: STUDENT IN AN ORGANIZED HEALTH CARE EDUCATION/TRAINING PROGRAM

## 2024-12-31 NOTE — PATIENT INSTRUCTIONS
It was a pleasure seeing you today!    Obtain echocardiogram sometime in March  Follow-up in 3 to 4 months  Refer to hematology oncology for further evaluation    Silke Prado MD  Pulmonary and Critical Care Medicine

## 2024-12-31 NOTE — PROGRESS NOTES
Pulmonary Outpatient Consultation Note   Satnam Aldrich 64 y.o. male MRN: [id number]  12/31/2024    Referring provider:   Justin Lagos Do  [address] [place] [id number]     Reason for Consultation:  PE management     Assessment:    Acute intermediate low risk pulmonary rhythm apparently with no RV heart strain per report, this is likely provoked by malignancy found to have abdominal tumor/carcinoid status post laparoscopic ileocecectomy.  He had an IVC filter inserted on December 4 prior to his laparoscopic surgery.  Has an appointment with [facility] hematology coming up, I will defer to them regarding anticoagulation length  Carcinoid tumor of the abdomen status post surgery at [facility] in [place] follows with surgical oncology at [facility].  Hopefully can transition over to [facility] for carcinoid monitoring  Lifetime non-smoker    Plan:    Continue Eliquis for minimum 3 months  Agree with referral to hematology service, has appointment with them in February.  Will be removing IVC with IR later this week   Obtain ultrasound 3 months from beginning of November to evaluate RV strain, CTEPH  I do not need any chest imaging, I will defer to hematology if they need PET scans or any additional imaging  Follow-up in 3 months  I have spent a total time of 42 minutes in caring for this patient on the day of the visit/encounter including Diagnostic results, Prognosis, Risks and benefits of tx options, Instructions for management, Patient and family education, Importance of tx compliance, Risk factor reductions, Impressions, Counseling / Coordination of care, Documenting in the medical record, Reviewing / ordering tests, medicine, procedures  , and Obtaining or reviewing history  .    History of Present Illness   HPI:    64-year-old gentleman here to establish care referred from primary care for pulmonary motor management.  On October 29 he woke up complaining of gasping of  air, difficulty with ambulation, dyspnea on exertion, tried to exercise but felt unwell went to the emergency room found to elevated D-dimer, CTA positive for saddle pulmonary embolism but was found to have no RV strain.  I started him on heparin and did a thrombosis panel which apparently was unremarkable.  Eventually they performed abdominal imaging was found to have carcinoid tumor and was referred to [facility] surgical oncology where they performed a laparoscopic ileocecectomy.    Afterwards establish care with primary care and referred to [facility] pulmonary and hematology as patient is looking to transition care over.    Doing well tolerating Eliquis, bruising on his left arm but no hemoptysis hematuria hematochezia.  Denies any falls.    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    Review of Systems   Constitutional:  Positive for activity change. Negative for fatigue.   HENT: Negative.     Eyes: Negative.    Respiratory: Negative.  Negative for shortness of breath.    Cardiovascular: Negative.    Gastrointestinal:  Positive for constipation.   Endocrine: Negative.    Genitourinary: Negative.    Musculoskeletal: Negative.    Skin: Negative.    Allergic/Immunologic: Negative.    Neurological: Negative.    Hematological:  Bruises/bleeds easily.   Psychiatric/Behavioral: Negative.         Historical Information   Past Medical History:   Diagnosis Date   • Cancer (HCC) 10/29/24    Er visit for pe   • GERD (gastroesophageal reflux disease)    • Hypertension      Past Surgical History:   Procedure Laterality Date   • SMALL INTESTINE SURGERY  12/10/24    Having IVc filter on 12/4/24     History reviewed. No pertinent family history.    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    Social History     Tobacco Use   Smoking Status Never   Smokeless Tobacco Never  "      Meds/Allergies     Current Outpatient Medications:   •  apixaban (ELIQUIS) 5 mg, Take 1 tablet (5 mg total) by mouth 2 (two) times a day for 7 days, Disp: 14 tablet, Rfl: 0  •  atorvastatin (LIPITOR) 20 mg tablet, Take 20 mg by mouth daily, Disp: , Rfl:   •  Bacillus Coagulans-Inulin (Probiotic) 1-250 BILLION-MG CAPS, , Disp: , Rfl:   •  candesartan (ATACAND) 16 mg tablet, , Disp: , Rfl:   •  esomeprazole (NexIUM) 10 MG packet, , Disp: , Rfl:   •  Multiple Vitamin (MULTIVITAMIN ADULT PO), , Disp: , Rfl:   No Known Allergies    Vitals: Blood pressure 114/66, pulse 71, temperature (!) 96.8 °F (36 °C), weight 123 kg (271 lb), SpO2 95%., Body mass index is 34.79 kg/m². Oxygen Therapy  SpO2: 95 %    Physical Exam:    GEN: alert and oriented x 3, pleasant and cooperative   HEENT:  Normocephalic, atraumatic  NECK: No JVD   HEART: Rate, normal S1 and S2  LUNGS: Clear to auscultation bilaterally; no wheezes, rales, or rhonchi; respiration nonlabored   ABDOMEN:  Normoactive bowel sounds, soft, no tenderness, no distention  EXTREMITIES: no edema  NEURO: no gross focal findings  SKIN:  Dry, intact, warm to touch    Labs: I have personally reviewed pertinent lab results.  No results found for: \"IGE\"    Imaging and other studies: Results Review Statement: No pertinent imaging studies reviewed.  Can't see images in Care Everywhere or impression    Pulmonary function testing:  NA    Other Studies: Results Review Statement: No pertinent imaging studies reviewed.  Unable to see surgical pathology    Silke Prado MD  Pulmonary and Critical Care Medicine     "

## 2025-01-02 DIAGNOSIS — I27.82 OTHER CHRONIC PULMONARY EMBOLISM WITHOUT ACUTE COR PULMONALE (HCC): Primary | ICD-10-CM

## 2025-01-02 NOTE — TELEPHONE ENCOUNTER
Patient called the RX Refill Line. Message is being forwarded to the office.     Patient called stating that he needs a script sent to the pharmacy for Xarelto 20 mg. The Eliquis is the medication that cost too much and he wants a new script for the Xarelto sent in. Please expedite.     Please contact patient at [phone] to keep him updated about the refill

## 2025-01-02 NOTE — TELEPHONE ENCOUNTER
Patient advised. He says [facility] just called him and advised they received rx but they are out of this med. He will call [facility] to see if they have and call us back.

## 2025-01-07 ENCOUNTER — CONSULT (OUTPATIENT)
Dept: HEMATOLOGY ONCOLOGY | Facility: MEDICAL CENTER | Age: 65
End: 2025-01-07
Payer: MEDICARE

## 2025-01-07 ENCOUNTER — TELEPHONE (OUTPATIENT)
Dept: HEMATOLOGY ONCOLOGY | Facility: CLINIC | Age: 65
End: 2025-01-07

## 2025-01-07 VITALS
SYSTOLIC BLOOD PRESSURE: 131 MMHG | RESPIRATION RATE: 17 BRPM | TEMPERATURE: 98.3 F | HEART RATE: 71 BPM | BODY MASS INDEX: 35.42 KG/M2 | DIASTOLIC BLOOD PRESSURE: 86 MMHG | HEIGHT: 74 IN | OXYGEN SATURATION: 98 % | WEIGHT: 276 LBS

## 2025-01-07 DIAGNOSIS — D3A.00 CARCINOID (EXCEPT OF APPENDIX): Primary | ICD-10-CM

## 2025-01-07 DIAGNOSIS — I27.82 OTHER CHRONIC PULMONARY EMBOLISM WITHOUT ACUTE COR PULMONALE (HCC): ICD-10-CM

## 2025-01-07 DIAGNOSIS — R60.1 GENERALIZED EDEMA: ICD-10-CM

## 2025-01-07 PROCEDURE — 99204 OFFICE O/P NEW MOD 45 MIN: CPT | Performed by: PHYSICIAN ASSISTANT

## 2025-01-07 RX ORDER — MENTHOL 1 %
POWDER (GRAM) TOPICAL DAILY
COMMUNITY

## 2025-01-07 NOTE — TELEPHONE ENCOUNTER
Called to go over the appts for the orders that were put in by the provider.    Was also able to get pt scheduled for follow up

## 2025-01-07 NOTE — PROGRESS NOTES
Name: Satnam Aldrich      : 1960      MRN: [id number]  Encounter Provider: Anny Nunn PA-C  Encounter Date: 2025   Encounter department: [facility]  :  Assessment & Plan  Other chronic pulmonary embolism without acute cor pulmonale (HCC)  Patient is a 64-year-old who presents for evaluation of pulmonary embolism.      His history is significant and that he was hospitalized on 10/29/2024 with finding of saddle pulmonary embolus and multiple pulmonary emboli (but no right heart strain).  At the same time he was found to have soft tissue in the small bowel which has been resected as below.  Pathology is consistent with carcinoid tumor.  He is following with surgical oncology at [facility] for follow-up/management.    Patient is currently on Xarelto 20 mg daily which he is compliant with and knows to continue.    He has no prior history of VTE.  No family history of VTE.    PE was likely provoked by malignancy (which has since been resected).    Patient should continue on Xarelto for atleast 6 months time (May 2025).    Will repeat a CTA/bilateral lower extremity Dopplers prior to his next visit and discontinuation of anticoagulation.    He is working to improve modifiable risk factors for VTE.  He is trying to be more physically active and lose weight.    We will see him back in clinic in around 4 months time.    Orders:    Ambulatory Referral to Hematology / Oncology    CTA chest pe study; Future     VAS VENOUS DUPLEX - LOWER LIMB BILATERAL; Future    Carcinoid (except of appendix)  Patient with recent finding of carcinoid tumor of the small bowel status post laparoscopic ileocecectomy on 12/10/2024.      He is following with surgical oncology at [facility] for surveillance.      History of Present Illness   Chief Complaint   Patient presents with    Consult     History of present illness:    Patient is a 64-year-old with past medical history  "significant for hypertension and hyperlipidemia.    He presented to [facility] on October 29, 2024 with acute onset of shortness of breath with minimal exertion.      He had a CTA of his chest which demonstrated a saddle pulmonary embolus and multiple pulmonary emboli.    A CT scan of his abdomen was also performed and demonstrated a soft tissue mass in the small bowel of the right lower quadrant measuring approximately 2.6 x 2.5 cm.  There was no evidence of obstruction or metastatic disease.  He was placed on Eliquis initially.    He was referred to surgical oncology at [facility].  Laparoscopic small bowel resection was recommended.  IVC filter was also placed.    He was then admitted at [facility] from 12/10/2024 through 12/18/2024.  He underwent laparoscopic ileocecectomy.  He was maintained on a heparin drip during his hospital stay and placed on Eliquis again at time of discharge.    Surgical pathology was consistent with carcinoid tumor.  He has recovered well from surgery.  He says that he was told that his margins were negative and he will not require any adjuvant treatment.  He says that he has a follow-up visit with surgical oncology next week.    He was also seen by vascular at [facility] on 1/3/2025.  He underwent a venous duplex which demonstrated no evidence of DVT bilaterally. He says his IVC filter will be removed on 1/27/25.    He is currently on Xarelto as he had difficulty remembering to take his Eliquis twice daily.      He denies SOB currently. He denies chest pain or SOB.  He is having no issues moving his bowels.  No significant nausea.  No vomiting.  He says that he has a sensation of \"sour stomach\" especially if he eats too much.  He is working to lose weight.  He is up-to-date with his routine health maintenance.    Review of Systems   Constitutional:  Negative for activity change, appetite change, fatigue and fever.   HENT:  Negative for nosebleeds.  " "  Respiratory:  Negative for cough, choking and shortness of breath.    Cardiovascular:  Negative for chest pain, palpitations and leg swelling.   Gastrointestinal:  Negative for abdominal distention, abdominal pain, anal bleeding, blood in stool, constipation, diarrhea, nausea and vomiting.   Endocrine: Negative for cold intolerance.   Genitourinary:  Negative for hematuria.   Musculoskeletal:  Negative for myalgias.   Skin:  Negative for color change, pallor and rash.   Allergic/Immunologic: Negative for immunocompromised state.   Neurological:  Negative for headaches.   Hematological:  Negative for adenopathy. Does not bruise/bleed easily.   All other systems reviewed and are negative.        Objective   /86 (BP Location: Left arm, Patient Position: Sitting, Cuff Size: Adult)   Pulse 71   Temp 98.3 °F (36.8 °C) (Temporal)   Resp 17   Ht 6' 2\" (1.88 m)   Wt 125 kg (276 lb)   SpO2 98%   BMI 35.44 kg/m²     Physical Exam  Constitutional:       General: He is not in acute distress.     Appearance: Normal appearance. He is well-developed.   HENT:      Head: Normocephalic and atraumatic.      Right Ear: External ear normal.      Left Ear: External ear normal.      Nose: Nose normal.   Eyes:      General: No scleral icterus.     Conjunctiva/sclera: Conjunctivae normal.   Cardiovascular:      Rate and Rhythm: Normal rate and regular rhythm.   Pulmonary:      Effort: Pulmonary effort is normal. No respiratory distress.      Breath sounds: Normal breath sounds.   Abdominal:      General: Abdomen is flat. There is no distension.      Palpations: Abdomen is soft.   Skin:     Findings: No rash (on exposed skin.).   Neurological:      Mental Status: He is alert and oriented to person, place, and time.   Psychiatric:         Mood and Affect: Mood normal.         Thought Content: Thought content normal.         Judgment: Judgment normal.       Labs: I have reviewed the following labs:  Results for orders placed or " performed in visit on 12/02/24   CBC and differential   Result Value Ref Range    WBC 8.09 4.31 - 10.16 Thousand/uL    RBC 5.28 3.88 - 5.62 Million/uL    Hemoglobin 16.0 12.0 - 17.0 g/dL    Hematocrit 47.6 36.5 - 49.3 %    MCV 90 82 - 98 fL    MCH 30.3 26.8 - 34.3 pg    MCHC 33.6 31.4 - 37.4 g/dL    RDW 13.5 11.6 - 15.1 %    MPV 10.1 8.9 - 12.7 fL    Platelets 200 149 - 390 Thousands/uL    nRBC 0 /100 WBCs    Segmented % 68 43 - 75 %    Immature Grans % 0 0 - 2 %    Lymphocytes % 25 14 - 44 %    Monocytes % 5 4 - 12 %    Eosinophils Relative 1 0 - 6 %    Basophils Relative 1 0 - 1 %    Absolute Neutrophils 5.48 1.85 - 7.62 Thousands/µL    Absolute Immature Grans 0.02 0.00 - 0.20 Thousand/uL    Absolute Lymphocytes 2.04 0.60 - 4.47 Thousands/µL    Absolute Monocytes 0.43 0.17 - 1.22 Thousand/µL    Eosinophils Absolute 0.08 0.00 - 0.61 Thousand/µL    Basophils Absolute 0.04 0.00 - 0.10 Thousands/µL   Comprehensive metabolic panel   Result Value Ref Range    Sodium 139 135 - 147 mmol/L    Potassium 4.9 3.5 - 5.3 mmol/L    Chloride 103 96 - 108 mmol/L    CO2 29 21 - 32 mmol/L    ANION GAP 7 4 - 13 mmol/L    BUN 8 5 - 25 mg/dL    Creatinine 0.91 0.60 - 1.30 mg/dL    Glucose 96 65 - 140 mg/dL    Calcium 9.4 8.4 - 10.2 mg/dL    AST 19 13 - 39 U/L    ALT 18 7 - 52 U/L    Alkaline Phosphatase 96 34 - 104 U/L    Total Protein 7.3 6.4 - 8.4 g/dL    Albumin 4.3 3.5 - 5.0 g/dL    Total Bilirubin 0.59 0.20 - 1.00 mg/dL    eGFR 88 ml/min/1.73sq m

## 2025-01-07 NOTE — ASSESSMENT & PLAN NOTE
Patient with recent finding of carcinoid tumor of the small bowel status post laparoscopic ileocecectomy on 12/10/2024.      He is following with surgical oncology at [facility] for surveillance.

## 2025-01-07 NOTE — ASSESSMENT & PLAN NOTE
Patient is a 64-year-old who presents for evaluation of pulmonary embolism.      His history is significant and that he was hospitalized on 10/29/2024 with finding of saddle pulmonary embolus and multiple pulmonary emboli (but no right heart strain).  At the same time he was found to have soft tissue in the small bowel which has been resected as below.  Pathology is consistent with carcinoid tumor.  He is following with surgical oncology at [facility] for follow-up/management.    Patient is currently on Xarelto 20 mg daily which he is compliant with and knows to continue.    He has no prior history of VTE.  No family history of VTE.    PE was likely provoked by malignancy (which has since been resected).    Patient should continue on Xarelto for atleast 6 months time (May 2025).    Will repeat a CTA/bilateral lower extremity Dopplers prior to his next visit and discontinuation of anticoagulation.    He is working to improve modifiable risk factors for VTE.  He is trying to be more physically active and lose weight.    We will see him back in clinic in around 4 months time.    Orders:    Ambulatory Referral to Hematology / Oncology    CTA chest pe study; Future     VAS VENOUS DUPLEX - LOWER LIMB BILATERAL; Future

## 2025-01-29 DIAGNOSIS — I27.82 OTHER CHRONIC PULMONARY EMBOLISM WITHOUT ACUTE COR PULMONALE (HCC): ICD-10-CM

## 2025-01-29 RX ORDER — RIVAROXABAN 20 MG/1
TABLET, FILM COATED ORAL
Qty: 30 TABLET | Refills: 2 | Status: SHIPPED | OUTPATIENT
Start: 2025-01-29

## 2025-03-20 ENCOUNTER — HOSPITAL ENCOUNTER (OUTPATIENT)
Dept: NON INVASIVE DIAGNOSTICS | Facility: HOSPITAL | Age: 65
Discharge: HOME | End: 2025-03-20
Attending: STUDENT IN AN ORGANIZED HEALTH CARE EDUCATION/TRAINING PROGRAM
Payer: MEDICARE

## 2025-03-20 ENCOUNTER — RESULTS FOLLOW-UP (OUTPATIENT)
Dept: PULMONOLOGY | Facility: CLINIC | Age: 65
End: 2025-03-20

## 2025-03-20 VITALS
SYSTOLIC BLOOD PRESSURE: 163 MMHG | WEIGHT: 276 LBS | HEIGHT: 74 IN | HEART RATE: 77 BPM | BODY MASS INDEX: 35.42 KG/M2 | DIASTOLIC BLOOD PRESSURE: 92 MMHG

## 2025-03-20 DIAGNOSIS — I27.82 OTHER CHRONIC PULMONARY EMBOLISM WITHOUT ACUTE COR PULMONALE (HCC): ICD-10-CM

## 2025-03-20 LAB
AORTIC ROOT: 3.5 CM
AV LVOT PEAK GRADIENT: 3 MMHG
AV PEAK GRADIENT: 4 MMHG
BSA FOR ECHO PROCEDURE: 2.49 M2
DOP CALC LVOT AREA: 3.14 CM2
DOP CALC LVOT DIAMETER: 2 CM
E WAVE DECELERATION TIME: 193 MS
E/A RATIO: 0.85
FRACTIONAL SHORTENING: 34 (ref 28–44)
INTERVENTRICULAR SEPTUM IN DIASTOLE (PARASTERNAL SHORT AXIS VIEW): 1 CM
INTERVENTRICULAR SEPTUM: 1 CM (ref 0.6–1.1)
LAAS-AP2: 21 CM2
LAAS-AP4: 21.5 CM2
LEFT ATRIUM AREA SYSTOLE SINGLE PLANE A4C: 21.2 CM2
LEFT ATRIUM SIZE: 4.7 CM
LEFT ATRIUM VOLUME (MOD BIPLANE): 59 ML
LEFT ATRIUM VOLUME INDEX (MOD BIPLANE): 23.7 ML/M2
LEFT INTERNAL DIMENSION IN SYSTOLE: 3.3 CM (ref 2.1–4)
LEFT VENTRICULAR INTERNAL DIMENSION IN DIASTOLE: 5 CM (ref 3.5–6)
LEFT VENTRICULAR POSTERIOR WALL IN END DIASTOLE: 0.9 CM
LEFT VENTRICULAR STROKE VOLUME: 71 ML
LVSV (TEICH): 71 ML
MV E'TISSUE VEL-LAT: 8 CM/S
MV E'TISSUE VEL-SEP: 9 CM/S
MV PEAK A VEL: 0.79 M/S
MV PEAK E VEL: 67 CM/S
MV STENOSIS PRESSURE HALF TIME: 56 MS
MV VALVE AREA P 1/2 METHOD: 3.93
PV PEAK GRADIENT: 4 MMHG
RIGHT ATRIUM AREA SYSTOLE A4C: 17.8 CM2
RIGHT VENTRICLE ID DIMENSION: 3.5 CM
SL CV LEFT ATRIUM LENGTH A2C: 5.9 CM
SL CV LV EF: 59
SL CV PED ECHO LEFT VENTRICLE DIASTOLIC VOLUME (MOD BIPLANE) 2D: 116 ML
SL CV PED ECHO LEFT VENTRICLE SYSTOLIC VOLUME (MOD BIPLANE) 2D: 45 ML
TRICUSPID ANNULAR PLANE SYSTOLIC EXCURSION: 2.9 CM
TRICUSPID VALVE PEAK REGURGITATION VELOCITY: 1.76 M/S

## 2025-03-20 PROCEDURE — 93306 TTE W/DOPPLER COMPLETE: CPT | Performed by: INTERNAL MEDICINE

## 2025-03-20 PROCEDURE — 93306 TTE W/DOPPLER COMPLETE: CPT

## 2025-03-21 DIAGNOSIS — I27.82 OTHER CHRONIC PULMONARY EMBOLISM WITHOUT ACUTE COR PULMONALE (HCC): ICD-10-CM

## 2025-03-21 RX ORDER — RIVAROXABAN 20 MG/1
TABLET, FILM COATED ORAL
Qty: 30 TABLET | Refills: 5 | Status: SHIPPED | OUTPATIENT
Start: 2025-03-21

## 2025-04-01 ENCOUNTER — OFFICE VISIT (OUTPATIENT)
Dept: PULMONOLOGY | Facility: MEDICAL CENTER | Age: 65
End: 2025-04-01
Payer: MEDICARE

## 2025-04-01 VITALS
TEMPERATURE: 97.5 F | WEIGHT: 280 LBS | HEIGHT: 74 IN | HEART RATE: 76 BPM | RESPIRATION RATE: 16 BRPM | SYSTOLIC BLOOD PRESSURE: 140 MMHG | OXYGEN SATURATION: 97 % | BODY MASS INDEX: 35.94 KG/M2 | DIASTOLIC BLOOD PRESSURE: 80 MMHG

## 2025-04-01 DIAGNOSIS — E78.00 PURE HYPERCHOLESTEROLEMIA: Primary | ICD-10-CM

## 2025-04-01 DIAGNOSIS — I27.82 OTHER CHRONIC PULMONARY EMBOLISM WITHOUT ACUTE COR PULMONALE (HCC): Primary | ICD-10-CM

## 2025-04-01 PROCEDURE — 99213 OFFICE O/P EST LOW 20 MIN: CPT | Performed by: STUDENT IN AN ORGANIZED HEALTH CARE EDUCATION/TRAINING PROGRAM

## 2025-04-01 NOTE — PROGRESS NOTES
Pulmonary Outpatient Progress Note   Satnam Aldrich 64 y.o. male MRN: [id number]  4/1/2025      Assessment:    Acute intermediate low risk pulmonary embolism has been on Eliquis, this was provoked by malignancy.  He plans for 6-month of anticoagulation which will be in May 2025.  He also follows up with hematology next-door.  He is tolerating Eliquis with no significant side effects.  Does occasionally have hemorrhoids but no other bleeding issues.  Carcinoid tumor of the abdomen status post surgery at [facility] in [place] follows with surgical oncology  Lifetime non-smoker    Plan:    Continue Eliquis until May 2025 as per hematology  I reviewed echocardiogram with him, no RV strain, no CTEPH signs  Continue to stay active  Can follow-up as needed      History of Present Illness   HPI:    64-year-old gentleman here for follow-up, initially referred due to pulmonary embolism that was picked up incidentally and then found to have carcinoid tumor status post surgery at [facility].  He was then referred to pulmonary postop and establish here locally.    Since then he has been tolerating Eliquis, has still seen hematology oncology group at [facility] who is now managing his malignancy and his anticoagulation.    He is getting more energy staying active doing pickleball able to do water volleyball and has no significant complaints.  He denies any symptoms.  He is tolerating anticoagulation.    Review of Systems   Constitutional: Negative.    HENT: Negative.     Eyes: Negative.    Respiratory: Negative.     Cardiovascular: Negative.    Gastrointestinal: Negative.    Endocrine: Negative.    Genitourinary: Negative.    Musculoskeletal: Negative.    Skin: Negative.    Allergic/Immunologic: Positive for immunocompromised state.   Neurological: Negative.    Hematological:  Bruises/bleeds easily.   Psychiatric/Behavioral: Negative.          Historical Information   Past Medical History:   Diagnosis Date   •  "Cancer (HCC) 10/29/24    Er visit for pe   • GERD (gastroesophageal reflux disease)    • Hypertension      Past Surgical History:   Procedure Laterality Date   • SMALL INTESTINE SURGERY  12/10/24    Having IVc filter on 12/4/24     History reviewed. No pertinent family history.  Social History     Tobacco Use   Smoking Status Never   Smokeless Tobacco Never       Occupational History: NA    Meds/Allergies     Current Outpatient Medications:   •  atorvastatin (LIPITOR) 20 mg tablet, Take 20 mg by mouth daily, Disp: , Rfl:   •  Bacillus Coagulans-Inulin (Probiotic) 1-250 BILLION-MG CAPS, , Disp: , Rfl:   •  candesartan (ATACAND) 16 mg tablet, , Disp: , Rfl:   •  esomeprazole (NexIUM) 10 MG packet, , Disp: , Rfl:   •  Multiple Vitamin (MULTIVITAMIN ADULT PO), , Disp: , Rfl:   •  Multiple Vitamins-Minerals (CENTRUM SILVER 50+MEN PO), Daily, Disp: , Rfl:   •  Podiatric Products ([employer] Foot) CREA, Daily, Disp: , Rfl:   •  rivaroxaban (Xarelto) 20 mg tablet, TAKE 1 TABLET(20 MG) BY MOUTH DAILY WITH BREAKFAST, Disp: 30 tablet, Rfl: 5  No Known Allergies    Vitals: Blood pressure 140/80, pulse 76, temperature 97.5 °F (36.4 °C), temperature source Temporal, resp. rate 16, height 6' 2\" (1.88 m), weight 127 kg (280 lb), SpO2 97%., Body mass index is 35.95 kg/m². Oxygen Therapy  SpO2: 97 %    Physical Exam:    GEN: alert and oriented x 3, pleasant and cooperative   HEENT:  Normocephalic, atraumatic  NECK: No JVD   HEART: Rate, normal S1 and S2  LUNGS: Clear to auscultation bilaterally; no wheezes, rales, or rhonchi; respiration nonlabored   ABDOMEN:  Normoactive bowel sounds, soft, no tenderness, no distention  EXTREMITIES: no edema  NEURO: no gross focal findings  SKIN:  Dry, intact, warm to touch    Labs: I have personally reviewed pertinent lab results.  No results found for: \"IGE\"    Imaging and other studies: Results Review Statement: No pertinent imaging studies reviewed.    Pulmonary function testing:  NA    Other " Studies: Results Review Statement: No pertinent imaging studies reviewed.    Silke Prado MD  Pulmonary and Critical Care Medicine

## 2025-04-01 NOTE — PATIENT INSTRUCTIONS
It was a pleasure seeing you today!    Follow-up as needed  Anticoagulation until May 2025    Silke Prado MD  Pulmonary and Critical Care Medicine

## 2025-04-02 DIAGNOSIS — E78.00 PURE HYPERCHOLESTEROLEMIA: ICD-10-CM

## 2025-04-02 RX ORDER — ATORVASTATIN CALCIUM 20 MG/1
20 TABLET, FILM COATED ORAL DAILY
Qty: 30 TABLET | Refills: 0 | Status: SHIPPED | OUTPATIENT
Start: 2025-04-02

## 2025-04-03 RX ORDER — ATORVASTATIN CALCIUM 20 MG/1
20 TABLET, FILM COATED ORAL DAILY
Qty: 90 TABLET | OUTPATIENT
Start: 2025-04-03

## 2025-04-07 ENCOUNTER — APPOINTMENT (OUTPATIENT)
Dept: LAB | Facility: CLINIC | Age: 65
End: 2025-04-07
Payer: MEDICARE

## 2025-04-07 DIAGNOSIS — Z13.0 SCREENING, IRON DEFICIENCY ANEMIA: ICD-10-CM

## 2025-04-07 DIAGNOSIS — E55.9 VITAMIN D DEFICIENCY: ICD-10-CM

## 2025-04-07 DIAGNOSIS — E87.8 ELECTROLYTE ABNORMALITY: ICD-10-CM

## 2025-04-07 DIAGNOSIS — Z12.5 PROSTATE CANCER SCREENING: ICD-10-CM

## 2025-04-07 DIAGNOSIS — Z13.29 THYROID DISORDER SCREENING: ICD-10-CM

## 2025-04-07 DIAGNOSIS — Z13.220 SCREENING CHOLESTEROL LEVEL: ICD-10-CM

## 2025-04-07 LAB
25(OH)D3 SERPL-MCNC: 20.6 NG/ML (ref 30–100)
ALBUMIN SERPL BCG-MCNC: 4.2 G/DL (ref 3.5–5)
ALP SERPL-CCNC: 101 U/L (ref 34–104)
ALT SERPL W P-5'-P-CCNC: 14 U/L (ref 7–52)
ANION GAP SERPL CALCULATED.3IONS-SCNC: 8 MMOL/L (ref 4–13)
AST SERPL W P-5'-P-CCNC: 17 U/L (ref 13–39)
BASOPHILS # BLD AUTO: 0.04 THOUSANDS/ÂΜL (ref 0–0.1)
BASOPHILS NFR BLD AUTO: 1 % (ref 0–1)
BILIRUB SERPL-MCNC: 0.65 MG/DL (ref 0.2–1)
BUN SERPL-MCNC: 14 MG/DL (ref 5–25)
CALCIUM SERPL-MCNC: 9.2 MG/DL (ref 8.4–10.2)
CHLORIDE SERPL-SCNC: 104 MMOL/L (ref 96–108)
CHOLEST SERPL-MCNC: 179 MG/DL (ref ?–200)
CO2 SERPL-SCNC: 27 MMOL/L (ref 21–32)
CREAT SERPL-MCNC: 0.97 MG/DL (ref 0.6–1.3)
EOSINOPHIL # BLD AUTO: 0.09 THOUSAND/ÂΜL (ref 0–0.61)
EOSINOPHIL NFR BLD AUTO: 1 % (ref 0–6)
ERYTHROCYTE [DISTWIDTH] IN BLOOD BY AUTOMATED COUNT: 13.9 % (ref 11.6–15.1)
GFR SERPL CREATININE-BSD FRML MDRD: 81 ML/MIN/1.73SQ M
GLUCOSE P FAST SERPL-MCNC: 112 MG/DL (ref 65–99)
HCT VFR BLD AUTO: 45.4 % (ref 36.5–49.3)
HDLC SERPL-MCNC: 61 MG/DL
HGB BLD-MCNC: 14.9 G/DL (ref 12–17)
IMM GRANULOCYTES # BLD AUTO: 0.03 THOUSAND/UL (ref 0–0.2)
IMM GRANULOCYTES NFR BLD AUTO: 0 % (ref 0–2)
LDLC SERPL CALC-MCNC: 87 MG/DL (ref 0–100)
LYMPHOCYTES # BLD AUTO: 2.4 THOUSANDS/ÂΜL (ref 0.6–4.47)
LYMPHOCYTES NFR BLD AUTO: 30 % (ref 14–44)
MCH RBC QN AUTO: 28.8 PG (ref 26.8–34.3)
MCHC RBC AUTO-ENTMCNC: 32.8 G/DL (ref 31.4–37.4)
MCV RBC AUTO: 88 FL (ref 82–98)
MONOCYTES # BLD AUTO: 0.38 THOUSAND/ÂΜL (ref 0.17–1.22)
MONOCYTES NFR BLD AUTO: 5 % (ref 4–12)
NEUTROPHILS # BLD AUTO: 5 THOUSANDS/ÂΜL (ref 1.85–7.62)
NEUTS SEG NFR BLD AUTO: 63 % (ref 43–75)
NONHDLC SERPL-MCNC: 118 MG/DL
NRBC BLD AUTO-RTO: 0 /100 WBCS
PLATELET # BLD AUTO: 213 THOUSANDS/UL (ref 149–390)
PMV BLD AUTO: 10 FL (ref 8.9–12.7)
POTASSIUM SERPL-SCNC: 4.7 MMOL/L (ref 3.5–5.3)
PROT SERPL-MCNC: 6.9 G/DL (ref 6.4–8.4)
PSA SERPL-MCNC: 0.98 NG/ML (ref 0–4)
RBC # BLD AUTO: 5.17 MILLION/UL (ref 3.88–5.62)
SODIUM SERPL-SCNC: 139 MMOL/L (ref 135–147)
TRIGL SERPL-MCNC: 154 MG/DL (ref ?–150)
TSH SERPL DL<=0.05 MIU/L-ACNC: 2.56 UIU/ML (ref 0.45–4.5)
WBC # BLD AUTO: 7.94 THOUSAND/UL (ref 4.31–10.16)

## 2025-04-07 PROCEDURE — 84443 ASSAY THYROID STIM HORMONE: CPT

## 2025-04-07 PROCEDURE — 80053 COMPREHEN METABOLIC PANEL: CPT

## 2025-04-07 PROCEDURE — 36415 COLL VENOUS BLD VENIPUNCTURE: CPT

## 2025-04-07 PROCEDURE — G0103 PSA SCREENING: HCPCS

## 2025-04-07 PROCEDURE — 82306 VITAMIN D 25 HYDROXY: CPT

## 2025-04-07 PROCEDURE — 80061 LIPID PANEL: CPT

## 2025-04-07 PROCEDURE — 85025 COMPLETE CBC W/AUTO DIFF WBC: CPT

## 2025-04-08 ENCOUNTER — RESULTS FOLLOW-UP (OUTPATIENT)
Dept: FAMILY MEDICINE CLINIC | Facility: CLINIC | Age: 65
End: 2025-04-08

## 2025-04-08 ENCOUNTER — HOSPITAL ENCOUNTER (OUTPATIENT)
Dept: RADIOLOGY | Facility: HOSPITAL | Age: 65
Discharge: HOME | End: 2025-04-08
Payer: MEDICARE

## 2025-04-08 DIAGNOSIS — R60.1 GENERALIZED EDEMA: ICD-10-CM

## 2025-04-08 DIAGNOSIS — I27.82 OTHER CHRONIC PULMONARY EMBOLISM WITHOUT ACUTE COR PULMONALE (HCC): ICD-10-CM

## 2025-04-08 PROCEDURE — 93970 EXTREMITY STUDY: CPT | Performed by: SURGERY

## 2025-04-08 PROCEDURE — 93970 EXTREMITY STUDY: CPT

## 2025-04-08 PROCEDURE — 71275 CT ANGIOGRAPHY CHEST: CPT

## 2025-04-08 RX ADMIN — IOHEXOL 85 ML: 350 INJECTION, SOLUTION INTRAVENOUS at 07:52

## 2025-04-11 ENCOUNTER — RESULTS FOLLOW-UP (OUTPATIENT)
Dept: HEMATOLOGY ONCOLOGY | Facility: MEDICAL CENTER | Age: 65
End: 2025-04-11

## 2025-04-28 DIAGNOSIS — Z00.6 ENCOUNTER FOR EXAMINATION FOR NORMAL COMPARISON OR CONTROL IN CLINICAL RESEARCH PROGRAM: ICD-10-CM

## 2025-05-01 DIAGNOSIS — E78.00 PURE HYPERCHOLESTEROLEMIA: ICD-10-CM

## 2025-05-02 RX ORDER — ATORVASTATIN CALCIUM 20 MG/1
20 TABLET, FILM COATED ORAL DAILY
Qty: 30 TABLET | Refills: 0 | Status: SHIPPED | OUTPATIENT
Start: 2025-05-02

## 2025-05-06 DIAGNOSIS — E78.00 PURE HYPERCHOLESTEROLEMIA: ICD-10-CM

## 2025-05-06 RX ORDER — ATORVASTATIN CALCIUM 20 MG/1
20 TABLET, FILM COATED ORAL DAILY
Qty: 90 TABLET | OUTPATIENT
Start: 2025-05-06

## 2025-05-13 ENCOUNTER — OFFICE VISIT (OUTPATIENT)
Dept: HEMATOLOGY ONCOLOGY | Facility: MEDICAL CENTER | Age: 65
End: 2025-05-13
Payer: MEDICARE

## 2025-05-13 VITALS
DIASTOLIC BLOOD PRESSURE: 82 MMHG | BODY MASS INDEX: 36.06 KG/M2 | HEIGHT: 74 IN | RESPIRATION RATE: 16 BRPM | OXYGEN SATURATION: 97 % | HEART RATE: 73 BPM | SYSTOLIC BLOOD PRESSURE: 126 MMHG | TEMPERATURE: 98 F | WEIGHT: 281 LBS

## 2025-05-13 DIAGNOSIS — D3A.00 CARCINOID (EXCEPT OF APPENDIX): Primary | ICD-10-CM

## 2025-05-13 DIAGNOSIS — Z86.711 HISTORY OF PULMONARY EMBOLISM: ICD-10-CM

## 2025-05-13 PROCEDURE — 99214 OFFICE O/P EST MOD 30 MIN: CPT | Performed by: PHYSICIAN ASSISTANT

## 2025-05-13 NOTE — PROGRESS NOTES
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   He is following with surgical oncology at [facility] for surveillance.    He says genetic testing was recommend by his oncologist at . He says he had a large OOP cost. Patient is requesting referral to oncology genetics here at . Order placed.    Orders:    Ambulatory Referral to Oncology Genetics; Future    History of pulmonary embolism  Patient is a 65-year-old who presents for follow-up of pulmonary embolism.       His history is significant in that he was hospitalized on 10/29/2024 with finding of saddle pulmonary embolus and multiple pulmonary emboli (but no right heart strain).  At the same time he was found to have soft tissue mass in the small bowel which has been resected as below.  Pathology is consistent with desmoid tumor.  He currently has no evidence of disease.  He is following with surgical oncology at [facility] for follow-up/management.     Patient is currently on Xarelto 20 mg daily.     He has no prior history of VTE.  No family history of VTE.     PE was likely provoked by small bowel tumor (which has since been resected).    He had a CTA chest PE study on 2025.  No pulmonary embolism identified. Incidental 2.0 cm right thyroid nodule, for which a dedicated thyroid ultrasound is recommended for further evaluation.  He says that his primary care physician is managing the thyroid nodule.    He had repeat venous duplex of the bilateral lower extremities on the same day which showed no evidence of acute or chronic DVT in either lower extremity.     At this point, patient has completed 6 months of  anticoagulation.  I discussed with Dr. Carrillo who recommends continuing with prophylactic dose Xarelto (10mg/day) due to extent of pulmonary embolism (saddle PE.)    At this point patient is unsure if he wants to continue Xarelto 10mg/day.  Patient is also overweight which he is aware is also a risk factor for repeat VTE.    He would like to do some additional research prior to deciding if he would like to continue with the Xarelto or not. He will let me know via [employer] what he would like to do.     If he plans to continue on Xarelto 10 mg/day he will be seen for follow-up visit in 6 months time.  If he plans to discontinue the Xarelto altogether then he will be made PRN.    History of Present Illness   Chief Complaint   Patient presents with    Follow-up   History of present illness:  Patient is a 65-year-old with past medical history significant for hypertension and hyperlipidemia.     He presented to [facility] on October 29, 2024 with acute onset of shortness of breath with minimal exertion.       He had a CTA of his chest which demonstrated a saddle pulmonary embolus and multiple pulmonary emboli.     A CT scan of his abdomen was also performed and demonstrated a soft tissue mass in the small bowel of the right lower quadrant measuring approximately 2.6 x 2.5 cm.  There was no evidence of obstruction or metastatic disease.  He was placed on Eliquis initially.     He was referred to surgical oncology at [facility].  Laparoscopic small bowel resection was recommended.  IVC filter was also placed.     He was then admitted at [facility] from 12/10/2024 through 12/18/2024.  He underwent laparoscopic ileocecectomy.  He was maintained on a heparin drip during his hospital stay and placed on Eliquis again at time of discharge.     Surgical pathology was consistent with carcinoid (desmoid) tumor.  He has recovered well from surgery.  He says that he was told that his margins were negative and he  will not require any adjuvant treatment.  He is following with surgical oncology at [facility] every 6 months.     He was also seen by vascular at [facility] on 1/3/2025.  He says his IVC filter was removed on 1/27/25.     He is currently on Xarelto as he had difficulty remembering to take his Eliquis twice daily.       Alexander says he currently feels great. He has mild bruising on the bilateral upper extremities. No bleeding.     He denies chest pain or SOB.  He plays [employer] ball frequently and is working to lose weight.  He is up-to-date with his routine health maintenance.     Review of Systems   Constitutional:  Negative for activity change, appetite change, fatigue and fever.   HENT:  Negative for nosebleeds.    Respiratory:  Negative for cough, choking and shortness of breath.    Cardiovascular:  Negative for chest pain, palpitations and leg swelling.   Gastrointestinal:  Negative for abdominal distention, abdominal pain, anal bleeding, blood in stool, constipation, diarrhea, nausea and vomiting.   Endocrine: Negative for cold intolerance.   Genitourinary:  Negative for hematuria.   Musculoskeletal:  Negative for myalgias.   Skin:  Negative for color change, pallor and rash.   Allergic/Immunologic: Negative for immunocompromised state.   Neurological:  Negative for headaches.   Hematological:  Negative for adenopathy. Does not bruise/bleed easily.   All other systems reviewed and are negative.     Objective   Vitals:    05/13/25 0802   BP: 126/82   Pulse: 73   Resp: 16   Temp: 98 °F (36.7 °C)   SpO2: 97%     Physical Exam  Constitutional:       General: He is not in acute distress.     Appearance: Normal appearance. He is well-developed.   HENT:      Head: Normocephalic and atraumatic.      Right Ear: External ear normal.      Left Ear: External ear normal.      Nose: Nose normal.   Eyes:      General: No scleral icterus.     Conjunctiva/sclera: Conjunctivae normal.   Cardiovascular:      Rate and  Rhythm: Normal rate and regular rhythm.   Pulmonary:      Effort: Pulmonary effort is normal. No respiratory distress.      Breath sounds: Normal breath sounds.   Abdominal:      General: There is no distension.      Palpations: Abdomen is soft.      Tenderness: There is no abdominal tenderness.   Skin:     Findings: Bruising (mild on BUEs.) present. No rash (on exposed skin.).   Neurological:      Mental Status: He is alert and oriented to person, place, and time.   Psychiatric:         Mood and Affect: Mood normal.         Thought Content: Thought content normal.         Judgment: Judgment normal.       Labs: I have reviewed the following labs:  Results for orders placed or performed in visit on 04/07/25   Comprehensive metabolic panel   Result Value Ref Range    Sodium 139 135 - 147 mmol/L    Potassium 4.7 3.5 - 5.3 mmol/L    Chloride 104 96 - 108 mmol/L    CO2 27 21 - 32 mmol/L    ANION GAP 8 4 - 13 mmol/L    BUN 14 5 - 25 mg/dL    Creatinine 0.97 0.60 - 1.30 mg/dL    Glucose, Fasting 112 (H) 65 - 99 mg/dL    Calcium 9.2 8.4 - 10.2 mg/dL    AST 17 13 - 39 U/L    ALT 14 7 - 52 U/L    Alkaline Phosphatase 101 34 - 104 U/L    Total Protein 6.9 6.4 - 8.4 g/dL    Albumin 4.2 3.5 - 5.0 g/dL    Total Bilirubin 0.65 0.20 - 1.00 mg/dL    eGFR 81 ml/min/1.73sq m   CBC and differential   Result Value Ref Range    WBC 7.94 4.31 - 10.16 Thousand/uL    RBC 5.17 3.88 - 5.62 Million/uL    Hemoglobin 14.9 12.0 - 17.0 g/dL    Hematocrit 45.4 36.5 - 49.3 %    MCV 88 82 - 98 fL    MCH 28.8 26.8 - 34.3 pg    MCHC 32.8 31.4 - 37.4 g/dL    RDW 13.9 11.6 - 15.1 %    MPV 10.0 8.9 - 12.7 fL    Platelets 213 149 - 390 Thousands/uL    nRBC 0 /100 WBCs    Segmented % 63 43 - 75 %    Immature Grans % 0 0 - 2 %    Lymphocytes % 30 14 - 44 %    Monocytes % 5 4 - 12 %    Eosinophils Relative 1 0 - 6 %    Basophils Relative 1 0 - 1 %    Absolute Neutrophils 5.00 1.85 - 7.62 Thousands/µL    Absolute Immature Grans 0.03 0.00 - 0.20 Thousand/uL     Absolute Lymphocytes 2.40 0.60 - 4.47 Thousands/µL    Absolute Monocytes 0.38 0.17 - 1.22 Thousand/µL    Eosinophils Absolute 0.09 0.00 - 0.61 Thousand/µL    Basophils Absolute 0.04 0.00 - 0.10 Thousands/µL   Lipid panel   Result Value Ref Range    Cholesterol 179 See Comment mg/dL    Triglycerides 154 (H) See Comment mg/dL    HDL, Direct 61 >=40 mg/dL    LDL Calculated 87 0 - 100 mg/dL    Non-HDL-Chol (CHOL-HDL) 118 mg/dl   TSH, 3rd generation with Free T4 reflex   Result Value Ref Range    TSH 3RD GENERATON 2.565 0.450 - 4.500 uIU/mL   Vitamin D 25 hydroxy   Result Value Ref Range    Vit D, 25-Hydroxy 20.6 (L) 30.0 - 100.0 ng/mL   PSA, Total Screen   Result Value Ref Range    PSA 0.980 0.000 - 4.000 ng/mL

## 2025-05-13 NOTE — ASSESSMENT & PLAN NOTE
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   He is following with surgical oncology at [facility] for surveillance.    He says genetic testing was recommend by his oncologist at . He says he had a large OOP cost. Patient is requesting referral to oncology genetics here at . Order placed.    Orders:    Ambulatory Referral to Oncology Genetics; Future

## 2025-05-20 ENCOUNTER — APPOINTMENT (OUTPATIENT)
Dept: LAB | Facility: CLINIC | Age: 65
End: 2025-05-20

## 2025-05-20 DIAGNOSIS — Z00.6 ENCOUNTER FOR EXAMINATION FOR NORMAL COMPARISON OR CONTROL IN CLINICAL RESEARCH PROGRAM: ICD-10-CM

## 2025-05-20 PROCEDURE — 36415 COLL VENOUS BLD VENIPUNCTURE: CPT

## 2025-05-27 ENCOUNTER — PATIENT MESSAGE (OUTPATIENT)
Dept: FAMILY MEDICINE CLINIC | Facility: CLINIC | Age: 65
End: 2025-05-27

## 2025-05-30 DIAGNOSIS — D3A.00 CARCINOID (EXCEPT OF APPENDIX): Primary | ICD-10-CM

## 2025-06-01 LAB
APOB+LDLR+PCSK9 GENE MUT ANL BLD/T: NOT DETECTED
BRCA1+BRCA2 DEL+DUP + FULL MUT ANL BLD/T: NOT DETECTED
MLH1+MSH2+MSH6+PMS2 GN DEL+DUP+FUL M: NOT DETECTED

## 2025-06-02 DIAGNOSIS — E78.00 PURE HYPERCHOLESTEROLEMIA: ICD-10-CM

## 2025-06-03 RX ORDER — ATORVASTATIN CALCIUM 20 MG/1
20 TABLET, FILM COATED ORAL DAILY
Qty: 90 TABLET | Refills: 1 | Status: SHIPPED | OUTPATIENT
Start: 2025-06-03

## 2025-06-26 ENCOUNTER — APPOINTMENT (OUTPATIENT)
Dept: LAB | Facility: CLINIC | Age: 65
End: 2025-06-26
Attending: STUDENT IN AN ORGANIZED HEALTH CARE EDUCATION/TRAINING PROGRAM
Payer: MEDICARE

## 2025-06-26 ENCOUNTER — OFFICE VISIT (OUTPATIENT)
Dept: FAMILY MEDICINE CLINIC | Facility: CLINIC | Age: 65
End: 2025-06-26
Payer: MEDICARE

## 2025-06-26 VITALS
HEART RATE: 81 BPM | WEIGHT: 275 LBS | HEIGHT: 74 IN | DIASTOLIC BLOOD PRESSURE: 80 MMHG | SYSTOLIC BLOOD PRESSURE: 140 MMHG | RESPIRATION RATE: 12 BRPM | OXYGEN SATURATION: 98 % | TEMPERATURE: 97.9 F | BODY MASS INDEX: 35.29 KG/M2

## 2025-06-26 DIAGNOSIS — K21.9 GASTROESOPHAGEAL REFLUX DISEASE WITHOUT ESOPHAGITIS: ICD-10-CM

## 2025-06-26 DIAGNOSIS — K52.9 GASTROENTERITIS: Primary | ICD-10-CM

## 2025-06-26 DIAGNOSIS — D3A.00 CARCINOID (EXCEPT OF APPENDIX): ICD-10-CM

## 2025-06-26 DIAGNOSIS — E87.8 ELECTROLYTE ABNORMALITY: ICD-10-CM

## 2025-06-26 DIAGNOSIS — E66.01 OBESITY, MORBID (HCC): ICD-10-CM

## 2025-06-26 DIAGNOSIS — E04.1 THYROID NODULE: ICD-10-CM

## 2025-06-26 DIAGNOSIS — Z13.0 SCREENING, IRON DEFICIENCY ANEMIA: ICD-10-CM

## 2025-06-26 DIAGNOSIS — I10 PRIMARY HYPERTENSION: ICD-10-CM

## 2025-06-26 DIAGNOSIS — I27.82 OTHER CHRONIC PULMONARY EMBOLISM WITHOUT ACUTE COR PULMONALE (HCC): ICD-10-CM

## 2025-06-26 LAB
ALBUMIN SERPL BCG-MCNC: 4.1 G/DL (ref 3.5–5)
ALP SERPL-CCNC: 317 U/L (ref 34–104)
ALT SERPL W P-5'-P-CCNC: 215 U/L (ref 7–52)
ANION GAP SERPL CALCULATED.3IONS-SCNC: 9 MMOL/L (ref 4–13)
AST SERPL W P-5'-P-CCNC: 95 U/L (ref 13–39)
BASOPHILS # BLD AUTO: 0.02 THOUSANDS/ÂΜL (ref 0–0.1)
BASOPHILS NFR BLD AUTO: 0 % (ref 0–1)
BILIRUB SERPL-MCNC: 3.37 MG/DL (ref 0.2–1)
BUN SERPL-MCNC: 12 MG/DL (ref 5–25)
CALCIUM SERPL-MCNC: 9.5 MG/DL (ref 8.4–10.2)
CHLORIDE SERPL-SCNC: 103 MMOL/L (ref 96–108)
CO2 SERPL-SCNC: 23 MMOL/L (ref 21–32)
CREAT SERPL-MCNC: 0.97 MG/DL (ref 0.6–1.3)
EOSINOPHIL # BLD AUTO: 0.06 THOUSAND/ÂΜL (ref 0–0.61)
EOSINOPHIL NFR BLD AUTO: 1 % (ref 0–6)
ERYTHROCYTE [DISTWIDTH] IN BLOOD BY AUTOMATED COUNT: 14.2 % (ref 11.6–15.1)
GFR SERPL CREATININE-BSD FRML MDRD: 81 ML/MIN/1.73SQ M
GLUCOSE P FAST SERPL-MCNC: 118 MG/DL (ref 65–99)
HCT VFR BLD AUTO: 44.7 % (ref 36.5–49.3)
HGB BLD-MCNC: 15.1 G/DL (ref 12–17)
IMM GRANULOCYTES # BLD AUTO: 0.04 THOUSAND/UL (ref 0–0.2)
IMM GRANULOCYTES NFR BLD AUTO: 0 % (ref 0–2)
LYMPHOCYTES # BLD AUTO: 1.19 THOUSANDS/ÂΜL (ref 0.6–4.47)
LYMPHOCYTES NFR BLD AUTO: 13 % (ref 14–44)
MAGNESIUM SERPL-MCNC: 2.4 MG/DL (ref 1.9–2.7)
MCH RBC QN AUTO: 30 PG (ref 26.8–34.3)
MCHC RBC AUTO-ENTMCNC: 33.8 G/DL (ref 31.4–37.4)
MCV RBC AUTO: 89 FL (ref 82–98)
MONOCYTES # BLD AUTO: 0.65 THOUSAND/ÂΜL (ref 0.17–1.22)
MONOCYTES NFR BLD AUTO: 7 % (ref 4–12)
NEUTROPHILS # BLD AUTO: 7.05 THOUSANDS/ÂΜL (ref 1.85–7.62)
NEUTS SEG NFR BLD AUTO: 79 % (ref 43–75)
NRBC BLD AUTO-RTO: 0 /100 WBCS
PLATELET # BLD AUTO: 226 THOUSANDS/UL (ref 149–390)
PMV BLD AUTO: 10.3 FL (ref 8.9–12.7)
POTASSIUM SERPL-SCNC: 4.1 MMOL/L (ref 3.5–5.3)
PROT SERPL-MCNC: 7.8 G/DL (ref 6.4–8.4)
RBC # BLD AUTO: 5.04 MILLION/UL (ref 3.88–5.62)
SODIUM SERPL-SCNC: 135 MMOL/L (ref 135–147)
WBC # BLD AUTO: 9.01 THOUSAND/UL (ref 4.31–10.16)

## 2025-06-26 PROCEDURE — 83735 ASSAY OF MAGNESIUM: CPT

## 2025-06-26 PROCEDURE — 99214 OFFICE O/P EST MOD 30 MIN: CPT | Performed by: STUDENT IN AN ORGANIZED HEALTH CARE EDUCATION/TRAINING PROGRAM

## 2025-06-26 PROCEDURE — 36415 COLL VENOUS BLD VENIPUNCTURE: CPT

## 2025-06-26 PROCEDURE — G2211 COMPLEX E/M VISIT ADD ON: HCPCS | Performed by: STUDENT IN AN ORGANIZED HEALTH CARE EDUCATION/TRAINING PROGRAM

## 2025-06-26 PROCEDURE — 80053 COMPREHEN METABOLIC PANEL: CPT

## 2025-06-26 PROCEDURE — 85025 COMPLETE CBC W/AUTO DIFF WBC: CPT

## 2025-06-26 RX ORDER — DICYCLOMINE HYDROCHLORIDE 10 MG/1
10 CAPSULE ORAL
Qty: 30 CAPSULE | Refills: 0 | Status: SHIPPED | OUTPATIENT
Start: 2025-06-26

## 2025-06-26 RX ORDER — PANTOPRAZOLE SODIUM 40 MG/1
40 TABLET, DELAYED RELEASE ORAL DAILY
Qty: 90 TABLET | Refills: 0 | Status: SHIPPED | OUTPATIENT
Start: 2025-06-26

## 2025-06-26 RX ORDER — IBUPROFEN 200 MG
TABLET ORAL DAILY
COMMUNITY

## 2025-06-26 RX ORDER — MULTIVIT-MIN/IRON/FOLIC ACID/K 18-600-40
CAPSULE ORAL
COMMUNITY
Start: 2025-04-01

## 2025-06-26 NOTE — ASSESSMENT & PLAN NOTE
Anticoagulation with patient, most recent imaging reviewed, patient is holding off on Xarelto 10 mg daily at this time, based on vitals and symptoms, no suspicion for blood clot at this time

## 2025-06-26 NOTE — PROGRESS NOTES
Name: Satnam Aldrich      : 1960      MRN: [id number]  Encounter Provider: Justin Lagos DO  Encounter Date: 2025   Encounter department: [facility]  :  Assessment & Plan  Gastroenteritis  Symptoms consistent with gastroenteritis, recommend trial of Bentyl, will switch PPI to Protonix 40 mg in the morning, evaluation with gastroenterology, blood work today in office.    Patient does have history of carcinoid tumor which has been removed, if symptoms do persist CT abdomen/pelvis with contrast recommended for further evaluation.  Orders:  •  dicyclomine (BENTYL) 10 mg capsule; Take 1 capsule (10 mg total) by mouth 3 (three) times a day before meals  •  Ambulatory Referral to Gastroenterology; Future    Obesity, morbid (HCC)         Other chronic pulmonary embolism without acute cor pulmonale (HCC)  Anticoagulation with patient, most recent imaging reviewed, patient is holding off on Xarelto 10 mg daily at this time, based on vitals and symptoms, no suspicion for blood clot at this time       Primary hypertension  Blood pressure is well-controlled, monitor       Gastroesophageal reflux disease without esophagitis    Orders:  •  pantoprazole (PROTONIX) 40 mg tablet; Take 1 tablet (40 mg total) by mouth daily    Carcinoid (except of appendix)    Orders:  •  Ambulatory Referral to Gastroenterology; Future    Electrolyte abnormality    Orders:  •  Comprehensive metabolic panel; Future  •  Magnesium; Future    Screening, iron deficiency anemia    Orders:  •  CBC and differential; Future    Thyroid nodule               Depression Screening and Follow-up Plan: Patient was screened for depression during today's encounter. They screened negative with a PHQ-2 score of 1.        History of Present Illness   Acute care visit.     GI Problem  The primary symptoms include nausea. Primary symptoms do not include fever, abdominal pain, vomiting, diarrhea, dysuria, arthralgias or rash.  "  The illness does not include chills, constipation or back pain.     Review of Systems   Constitutional:  Negative for chills and fever.   HENT:  Negative for ear pain and sore throat.    Eyes:  Negative for pain and visual disturbance.   Respiratory:  Negative for cough and shortness of breath.    Cardiovascular:  Negative for chest pain and palpitations.   Gastrointestinal:  Positive for nausea. Negative for abdominal pain, constipation, diarrhea and vomiting.   Genitourinary:  Negative for dysuria and hematuria.   Musculoskeletal:  Negative for arthralgias and back pain.   Skin:  Negative for color change and rash.   Neurological:  Negative for seizures and syncope.   Psychiatric/Behavioral:  Negative for agitation, behavioral problems and confusion.    All other systems reviewed and are negative.      Objective   /80 (BP Location: Left arm, Patient Position: Sitting, Cuff Size: Standard)   Pulse 81   Temp 97.9 °F (36.6 °C) (Temporal)   Resp 12   Ht 6' 2\" (1.88 m)   Wt 125 kg (275 lb)   SpO2 98%   BMI 35.31 kg/m²      Physical Exam  Vitals and nursing note reviewed.   Constitutional:       General: He is not in acute distress.     Appearance: He is well-developed.   HENT:      Head: Normocephalic and atraumatic.     Eyes:      General: No scleral icterus.     Conjunctiva/sclera: Conjunctivae normal.       Cardiovascular:      Rate and Rhythm: Normal rate and regular rhythm.      Pulses: Normal pulses.      Heart sounds: No murmur heard.  Pulmonary:      Effort: Pulmonary effort is normal. No respiratory distress.      Breath sounds: Normal breath sounds.   Abdominal:      General: There is no distension.      Palpations: Abdomen is soft.      Tenderness: There is no abdominal tenderness.     Musculoskeletal:         General: No swelling. Normal range of motion.      Cervical back: Neck supple.     Skin:     General: Skin is warm and dry.      Capillary Refill: Capillary refill takes less than 2 " seconds.     Neurological:      General: No focal deficit present.      Mental Status: He is alert and oriented to person, place, and time. Mental status is at baseline.     Psychiatric:         Mood and Affect: Mood normal.         Behavior: Behavior normal.

## 2025-06-30 ENCOUNTER — HOSPITAL ENCOUNTER (OUTPATIENT)
Dept: RADIOLOGY | Facility: HOSPITAL | Age: 65
Discharge: HOME | End: 2025-06-30
Attending: STUDENT IN AN ORGANIZED HEALTH CARE EDUCATION/TRAINING PROGRAM
Payer: MEDICARE

## 2025-06-30 ENCOUNTER — APPOINTMENT (OUTPATIENT)
Dept: LAB | Facility: HOSPITAL | Age: 65
End: 2025-06-30
Attending: STUDENT IN AN ORGANIZED HEALTH CARE EDUCATION/TRAINING PROGRAM
Payer: MEDICARE

## 2025-06-30 DIAGNOSIS — K52.9 GASTROENTERITIS: ICD-10-CM

## 2025-06-30 DIAGNOSIS — R17 SERUM TOTAL BILIRUBIN ELEVATED: ICD-10-CM

## 2025-06-30 DIAGNOSIS — R10.11 CONTINUOUS RIGHT UPPER QUADRANT PAIN: ICD-10-CM

## 2025-06-30 DIAGNOSIS — R79.89 ELEVATED LFTS: ICD-10-CM

## 2025-06-30 DIAGNOSIS — E87.8 ELECTROLYTE ABNORMALITY: ICD-10-CM

## 2025-06-30 LAB
ALBUMIN SERPL BCG-MCNC: 3.9 G/DL (ref 3.5–5)
ALP SERPL-CCNC: 255 U/L (ref 34–104)
ALT SERPL W P-5'-P-CCNC: 86 U/L (ref 7–52)
ANION GAP SERPL CALCULATED.3IONS-SCNC: 8 MMOL/L (ref 4–13)
AST SERPL W P-5'-P-CCNC: 38 U/L (ref 13–39)
BASOPHILS # BLD AUTO: 0.04 THOUSANDS/ÂΜL (ref 0–0.1)
BASOPHILS NFR BLD AUTO: 1 % (ref 0–1)
BILIRUB SERPL-MCNC: 1.22 MG/DL (ref 0.2–1)
BUN SERPL-MCNC: 13 MG/DL (ref 5–25)
CALCIUM SERPL-MCNC: 9.2 MG/DL (ref 8.4–10.2)
CHLORIDE SERPL-SCNC: 102 MMOL/L (ref 96–108)
CO2 SERPL-SCNC: 25 MMOL/L (ref 21–32)
CREAT SERPL-MCNC: 0.89 MG/DL (ref 0.6–1.3)
EOSINOPHIL # BLD AUTO: 0.06 THOUSAND/ÂΜL (ref 0–0.61)
EOSINOPHIL NFR BLD AUTO: 1 % (ref 0–6)
ERYTHROCYTE [DISTWIDTH] IN BLOOD BY AUTOMATED COUNT: 13.9 % (ref 11.6–15.1)
GFR SERPL CREATININE-BSD FRML MDRD: 89 ML/MIN/1.73SQ M
GLUCOSE SERPL-MCNC: 98 MG/DL (ref 65–140)
HCT VFR BLD AUTO: 43.3 % (ref 36.5–49.3)
HGB BLD-MCNC: 14.6 G/DL (ref 12–17)
IMM GRANULOCYTES # BLD AUTO: 0.05 THOUSAND/UL (ref 0–0.2)
IMM GRANULOCYTES NFR BLD AUTO: 1 % (ref 0–2)
LIPASE SERPL-CCNC: 156 U/L (ref 11–82)
LYMPHOCYTES # BLD AUTO: 1.92 THOUSANDS/ÂΜL (ref 0.6–4.47)
LYMPHOCYTES NFR BLD AUTO: 22 % (ref 14–44)
MCH RBC QN AUTO: 29.9 PG (ref 26.8–34.3)
MCHC RBC AUTO-ENTMCNC: 33.7 G/DL (ref 31.4–37.4)
MCV RBC AUTO: 89 FL (ref 82–98)
MONOCYTES # BLD AUTO: 0.47 THOUSAND/ÂΜL (ref 0.17–1.22)
MONOCYTES NFR BLD AUTO: 5 % (ref 4–12)
NEUTROPHILS # BLD AUTO: 6.11 THOUSANDS/ÂΜL (ref 1.85–7.62)
NEUTS SEG NFR BLD AUTO: 70 % (ref 43–75)
NRBC BLD AUTO-RTO: 0 /100 WBCS
PLATELET # BLD AUTO: 270 THOUSANDS/UL (ref 149–390)
PMV BLD AUTO: 9 FL (ref 8.9–12.7)
POTASSIUM SERPL-SCNC: 4.2 MMOL/L (ref 3.5–5.3)
PROT SERPL-MCNC: 7.4 G/DL (ref 6.4–8.4)
RBC # BLD AUTO: 4.88 MILLION/UL (ref 3.88–5.62)
SODIUM SERPL-SCNC: 135 MMOL/L (ref 135–147)
WBC # BLD AUTO: 8.65 THOUSAND/UL (ref 4.31–10.16)

## 2025-06-30 PROCEDURE — 85025 COMPLETE CBC W/AUTO DIFF WBC: CPT

## 2025-06-30 PROCEDURE — 74177 CT ABD & PELVIS W/CONTRAST: CPT

## 2025-06-30 PROCEDURE — 83690 ASSAY OF LIPASE: CPT

## 2025-06-30 PROCEDURE — 80053 COMPREHEN METABOLIC PANEL: CPT

## 2025-06-30 PROCEDURE — 36415 COLL VENOUS BLD VENIPUNCTURE: CPT

## 2025-06-30 RX ADMIN — IOHEXOL 100 ML: 350 INJECTION, SOLUTION INTRAVENOUS at 11:39

## 2025-07-07 ENCOUNTER — APPOINTMENT (OUTPATIENT)
Dept: LAB | Facility: CLINIC | Age: 65
End: 2025-07-07
Attending: STUDENT IN AN ORGANIZED HEALTH CARE EDUCATION/TRAINING PROGRAM
Payer: MEDICARE

## 2025-07-07 DIAGNOSIS — K85.80 OTHER ACUTE PANCREATITIS WITHOUT INFECTION OR NECROSIS: ICD-10-CM

## 2025-07-07 LAB
ALBUMIN SERPL BCG-MCNC: 4.1 G/DL (ref 3.5–5)
ALP SERPL-CCNC: 192 U/L (ref 34–104)
ALT SERPL W P-5'-P-CCNC: 36 U/L (ref 7–52)
ANION GAP SERPL CALCULATED.3IONS-SCNC: 11 MMOL/L (ref 4–13)
AST SERPL W P-5'-P-CCNC: 26 U/L (ref 13–39)
BASOPHILS # BLD AUTO: 0.05 THOUSANDS/ÂΜL (ref 0–0.1)
BASOPHILS NFR BLD AUTO: 1 % (ref 0–1)
BILIRUB SERPL-MCNC: 0.78 MG/DL (ref 0.2–1)
BUN SERPL-MCNC: 14 MG/DL (ref 5–25)
CALCIUM SERPL-MCNC: 9 MG/DL (ref 8.4–10.2)
CHLORIDE SERPL-SCNC: 101 MMOL/L (ref 96–108)
CO2 SERPL-SCNC: 25 MMOL/L (ref 21–32)
CREAT SERPL-MCNC: 0.86 MG/DL (ref 0.6–1.3)
EOSINOPHIL # BLD AUTO: 0.06 THOUSAND/ÂΜL (ref 0–0.61)
EOSINOPHIL NFR BLD AUTO: 1 % (ref 0–6)
ERYTHROCYTE [DISTWIDTH] IN BLOOD BY AUTOMATED COUNT: 13.2 % (ref 11.6–15.1)
GFR SERPL CREATININE-BSD FRML MDRD: 90 ML/MIN/1.73SQ M
GLUCOSE SERPL-MCNC: 89 MG/DL (ref 65–140)
HCT VFR BLD AUTO: 44.4 % (ref 36.5–49.3)
HGB BLD-MCNC: 14.6 G/DL (ref 12–17)
IMM GRANULOCYTES # BLD AUTO: 0.04 THOUSAND/UL (ref 0–0.2)
IMM GRANULOCYTES NFR BLD AUTO: 0 % (ref 0–2)
LIPASE SERPL-CCNC: 118 U/L (ref 11–82)
LYMPHOCYTES # BLD AUTO: 2.3 THOUSANDS/ÂΜL (ref 0.6–4.47)
LYMPHOCYTES NFR BLD AUTO: 24 % (ref 14–44)
MCH RBC QN AUTO: 29.8 PG (ref 26.8–34.3)
MCHC RBC AUTO-ENTMCNC: 32.9 G/DL (ref 31.4–37.4)
MCV RBC AUTO: 91 FL (ref 82–98)
MONOCYTES # BLD AUTO: 0.43 THOUSAND/ÂΜL (ref 0.17–1.22)
MONOCYTES NFR BLD AUTO: 5 % (ref 4–12)
NEUTROPHILS # BLD AUTO: 6.7 THOUSANDS/ÂΜL (ref 1.85–7.62)
NEUTS SEG NFR BLD AUTO: 69 % (ref 43–75)
NRBC BLD AUTO-RTO: 0 /100 WBCS
PLATELET # BLD AUTO: 337 THOUSANDS/UL (ref 149–390)
PMV BLD AUTO: 9.7 FL (ref 8.9–12.7)
POTASSIUM SERPL-SCNC: 4.5 MMOL/L (ref 3.5–5.3)
PROT SERPL-MCNC: 7 G/DL (ref 6.4–8.4)
RBC # BLD AUTO: 4.9 MILLION/UL (ref 3.88–5.62)
SODIUM SERPL-SCNC: 137 MMOL/L (ref 135–147)
WBC # BLD AUTO: 9.58 THOUSAND/UL (ref 4.31–10.16)

## 2025-07-07 PROCEDURE — 85025 COMPLETE CBC W/AUTO DIFF WBC: CPT

## 2025-07-07 PROCEDURE — 36415 COLL VENOUS BLD VENIPUNCTURE: CPT

## 2025-07-07 PROCEDURE — 80053 COMPREHEN METABOLIC PANEL: CPT

## 2025-07-07 PROCEDURE — 83690 ASSAY OF LIPASE: CPT
